# Patient Record
Sex: MALE | Race: WHITE | Employment: UNEMPLOYED | URBAN - METROPOLITAN AREA
[De-identification: names, ages, dates, MRNs, and addresses within clinical notes are randomized per-mention and may not be internally consistent; named-entity substitution may affect disease eponyms.]

---

## 2018-01-09 DIAGNOSIS — J44.9 CHRONIC OBSTRUCTIVE PULMONARY DISEASE, UNSPECIFIED COPD TYPE (HCC): Primary | ICD-10-CM

## 2018-01-09 NOTE — PROGRESS NOTES
Verbal Order with read back per Nato Edwards MD  For PFT smart panel. AMB POC PFT complete w/ bronchodilator  AMB POC PFT complete w/o bronchodilator    Dr. Andrew Mueller MD will co-sign the orders.

## 2018-01-16 ENCOUNTER — OFFICE VISIT (OUTPATIENT)
Dept: PULMONOLOGY | Age: 58
End: 2018-01-16

## 2018-01-16 VITALS
OXYGEN SATURATION: 95 % | WEIGHT: 177 LBS | TEMPERATURE: 97.1 F | BODY MASS INDEX: 27.78 KG/M2 | HEART RATE: 106 BPM | DIASTOLIC BLOOD PRESSURE: 85 MMHG | SYSTOLIC BLOOD PRESSURE: 118 MMHG | HEIGHT: 67 IN | RESPIRATION RATE: 18 BRPM

## 2018-01-16 DIAGNOSIS — R06.02 SHORTNESS OF BREATH: ICD-10-CM

## 2018-01-16 DIAGNOSIS — R06.09 DYSPNEA ON EXERTION: ICD-10-CM

## 2018-01-16 DIAGNOSIS — J44.9 CHRONIC OBSTRUCTIVE PULMONARY DISEASE, UNSPECIFIED COPD TYPE (HCC): Primary | ICD-10-CM

## 2018-01-16 DIAGNOSIS — Z77.090 ASBESTOS EXPOSURE: ICD-10-CM

## 2018-01-16 NOTE — PROGRESS NOTES
[address] [facility] [place] [facility]  Pulmonary, Critical Care, and Sleep Medicine    Pulmonary Office Initial Consultation  Name: Davy Dobbins 65 y.o. male  MRN: [id number]  : 1960  Service Date: 18    Referring Provider: Klever LOMBARDO  Chief Complaint:   Chief Complaint   Patient presents with   [employer] COPD     History of Present Illness:  Davy Dobbins is a 65 y.o. male, who presents to Pulmonary clinic referred for COPD management. Pt didn't see any physician for his breathing until a year ago when his father who had asbestosis told him to get checked. Pt reports breathing issues - dyspnea with exertion - for 20 years. Worsening more over the last year. Dyspnea now present at baseline  Cough, productive of clear sputum, worse when laying down -- alleviated by sleeping in a recliner  No hemoptysis  Pt reports he was prescribed an inhaler in the past, albuterol and advair -- he notes no improvement  No hx of asthma  Notes some sinus drainage but otherwise no issues  No hx of hospitalization for COPD  Pt has hx of bronchitis requiring ER -- 1-2 times per year, last ER visit was in the Spring. Denies chest pain, N/V/D, night sweats, LE swelling, orthopnea, abdominal pain, appetite changes. 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 Pt has hx of MI -- scheduled to see Cardiology      Past Medical Hx: I have personally reviewed medical hx  Past Medical History:   Diagnosis Date    Chronic lung disease    DJD of knee and shoulder  CAD - MI about 8 years ago    Past Surgical Hx: I have personally reviewed surgical hx  -Hernia repair -     Family Hx: I have personally reviewed the family hx. No family hx of cancer or hereditary lung disease with immediate family.   Family History   Problem Relation Age of Onset    No Known Problems Mother     Heart Attack Father        Social Hx: I have personally reviewed the social hx. Social History     Social History    Marital status:      Spouse name: N/A    Number of children: N/A    Years of education: N/A     Occupational History    Not on file. Social History Main Topics    Smoking status: Current Every Day Smoker     Packs/day: 1.00     Years: 35.00    Smokeless tobacco: Never Used    Alcohol use Yes    Drug use: No    Sexual activity: Not on file     Other Topics Concern    Not on file     Social History Narrative     Allergies: I have reviewed the allergy hx  No Known Allergies    Medications:  I have reviewed the patient's medications  Prior to Admission medications    Medication Sig Start Date End Date Taking? Authorizing Provider   -No medications -- pt not taking BP meds    Immunizations:  I have reviewed the patient's immunizations    There is no immunization history on file for this patient. Review of Systems:  A complete review of systems was performed as stated in the HPI, all others are negative.       Objective:    Physical Exam:  /85 (BP 1 Location: Right arm, BP Patient Position: At rest)  Pulse (!) 106  Temp 97.1 °F (36.2 °C) (Oral)   Resp 18  Ht 5' 7\" (1.702 m)  Wt 80.3 kg (177 lb)  SpO2 95%  BMI 27.72 kg/m2  Vitals were personally reviewed  Gen: no acute distress, pleasant and cooperative, sitting up in chair, able to climb to exam table w/o difficulty  HEENT: normocephalic/atraumatic, PERRLA, EOMI, no scleral icterus, nasal turbinates have no erythema, no nasal polyps, no oral lesions, good dentition, Mallampati I  Neck: supple, trachea midline, no JVD, no cervical and supraclavicular adenopathy  Chest: no lesions, with even rise and fall, no pectus excavatum or flail chest  CVS: regular rate rhythm, S1/S2, no murmurs/rubs/gallops  Lungs: distant breath sounds B/L, no wheezes/rales/rhonchi  Back: no kyphosis or scoliosis  Abdomen: soft, nontender, bowel sounds present, no hepatosplenomegaly  Ext: no pitting edema B/L, no peripheral cyanosis or clubbing  Neuro: grossly normal, AAOx3, normal strength and coordination grossly, no focal deficits  Skin: no rashes, erythema, lesions  Psych: normal memory, thought content, and processing    Labs: I have reviewed the patient's available labs and outside records as follows -- last CBC on 8/2017 shows no anemia/thrombocytopenia, BMP shows normal lytes and renal function with elevated glucose  CTA report from [facility] on 8/2017 reviewed:  Result Impression   IMPRESSION:    1.  No evidence of pulmonary embolism. 2.  Emphysema with dependent atelectasis. 2.  Stable right adrenal adenoma. Result Narrative   EXAM: CTA chest    INDICATION: Pain. COMPARISON: 4/7/11    TECHNIQUE: Axial CT imaging from the thoracic inlet through the diaphragm with intravenous contrast. Coronal and sagittal MIP reformats were generated. One or more dose reduction techniques were used on this CT: automated exposure control, adjustment of the mAs and/or kVp according to patient size, and iterative reconstruction techniques.  The specific techniques used on this CT exam have been documented in the patient's electronic medical record.      _______________    FINDINGS:    EXAM QUALITY: Adequate for diagnosis. PULMONARY ARTERIES: No evidence of pulmonary embolism.  Normal caliber of the main pulmonary artery diameter. MEDIASTINUM: Normal heart size. No evidence of right heart strain. Aorta is unremarkable. No pericardial effusion.  Patulous esophagus with a moderate hiatal hernia. LUNGS: No suspicious nodule or mass. No abnormal opacities.  Dependent bibasilar atelectasis.  Moderate emphysematous changes noted with scattered cysts throughout the lungs. PLEURA: Normal.    AIRWAY: Normal.    LYMPH NODES: No enlarged nodes.     UPPER ABDOMEN: Low-density right adrenal nodule, unchanged dating back to 2011, compatible with a adenoma.  It measures 2.7 x 2.2 cm.  There is median arcuate compression of the celiac artery    OTHER: No acute or aggressive osseous abnormalities identified. Substernal extension of the thyroid gland is noted. Imaging:  I have personally reviewed patient's imaging -- no imaging provided, only report noted above    PFTs:  I have reviewed the results personally as follows - lacho today suggests restrictive defect with no BD response    TTE:  I have reviewed the patient's TTE results  No results found for this or any previous visit. Assessment and Plan:  65 y.o. male with:    Impression:  1. Dyspnea:  Etiology most likely due to COPD/emphysema. Pt reports symptoms at baseline  2. COPD:  Based on symptoms either gold risk category B, FEV1 is 71%  3. Tobacco dependence: 1PPD for 45 years  4. CAD with hx of MI    Plan:  -CT chest to rule out endobronchial obstruction based on symptoms of chronic cough. If negative, advised patient to get LDCT once yearly for lung cancer surveillance.  -Spent time discussing and attempting to coordinate inhaler therapy for patient. Start Spiriva respimat 2 puffs once daily. Provided patient with coupon as well as had ancillary staff provide him assistance forms. Also had nurse navigator give information on free clinics in [address] [place] [address] Rd the patient regarding cessation of smoking. I reviewed health risks of tobacco use including increased risk of MI, stroke, cancer, etc.  We reviewed various approaches to cessation. Pt declined cessation assistance at this time.  -Advised patient to remain active  -Counseled patient to followup with Cardiology due to hx of CAD      RTC: Follow-up Disposition:  Return in about 4 weeks (around 2/13/2018).       Orders Placed This Encounter    AMB POC SPIROMETRY W/BRONCHODILATOR    CT CHEST W WO CONT    tiotropium bromide (SPIRIVA RESPIMAT) 2.5 mcg/actuation inhaler    tiotropium bromide (SPIRIVA RESPIMAT) 2.5 mcg/actuation inhaler         Regina Billy MD/MPH     Pulmonary, Critical Care Medicine  [facility]  01/16/18  1:44 PM

## 2018-01-16 NOTE — LETTER
1/17/2018 6:56 PM 
 
Patient:  Lambert Hagen YOB: 1960 Date of Visit: 1/16/2018 Dear GRUPO Peña 
[address] [facility] [address] [place] [address] VIA Facsimile: [phone] Pop Ngo MD 
[address] [facility] [address] [place] [address] VIA Facsimile: [phone] 
 : Thank you for referring Mr. Bethanie Morris to me for evaluation/treatment. Below are the relevant portions of my assessment and plan of care. If you have questions, please do not hesitate to call me. I look forward to following Keegan Mcbride along with you. Sincerely, Bennye Sacks MD/MPH Pulmonary, Critical Care Medicine [facility]

## 2018-01-16 NOTE — MR AVS SNAPSHOT
[address] [facility], [address] [address] 
[phone] Patient: April Hamlin MRN: [id number] Visit Information Date & Time Provider Department Dept. Phone Encounter #  
 1/16/2018  1:30 PM Yaima Rivera MD [facility] Makayla Flores [id number] Follow-up Instructions Return in about 4 weeks (around 2/13/2018). Your Appointments 2/13/2018 12:15 PM  
Follow Up with Yaima Rivera MD  
[address] ([facility] Appt Note: FROM 1/16/18  
 [address] [place] [address], [address] [address]  
[phone]  
  
   
 [address] [place] [address], [address] [facility],[address] [address] Upcoming Health Maintenance Date Due Hepatitis C Screening 1960 Pneumococcal 19-64 Medium Risk (1 of 1 - PPSV23) 8/24/1980 DTaP/Tdap/Td series (1 - Tdap) 8/24/1982 FOBT Q 1 YEAR AGE 50-75 8/24/2011 Influenza Age 5 to Adult 8/1/2017 Allergies as of 1/16/2018  Review Complete On: 1/16/2018 By: RT Italia No Known Allergies Current Immunizations  Never Reviewed No immunizations on file. Not reviewed this visit You Were Diagnosed With   
  
 Codes Comments Chronic obstructive pulmonary disease, unspecified COPD type ([facility]    -  Primary ICD-10-CM: J44.9 ICD-9-CM: 223 Asbestos exposure     ICD-10-CM: Z77.090 
ICD-9-CM: V15.84 Dyspnea on exertion     ICD-10-CM: R06.09 
ICD-9-CM: 786.09 Shortness of breath     ICD-10-CM: R06.02 
ICD-9-CM: 786.05 Vitals BP Pulse Temp Resp Height(growth percentile) Weight(growth percentile) 118/85 (BP 1 Location: Right arm, BP Patient Position: At rest) (!) 106 97.1 °F (36.2 °C) (Oral) 18 5' 7\" (1.702 m) 177 lb (80.3 kg) SpO2 BMI Smoking Status 95% 27.72 kg/m2 Current Every Day Smoker BMI and BSA Data Body Mass Index Body Surface Area 27.72 kg/m 2 1.95 m 2 Preferred Pharmacy Pharmacy Name Phone [facility] [address], [address] Your Updated Medication List  
  
   
This list is accurate as of: 1/16/18  2:25 PM.  Always use your most recent med list.  
  
  
  
  
 FLEXERIL 10 mg tablet Generic drug:  cyclobenzaprine Take 10 mg by mouth nightly. methadone 10 mg tablet Commonly known as:  DOLOPHINE  
1 po q 8 hrs for chronic pain. * tiotropium bromide 2.5 mcg/actuation inhaler Commonly known as:  [place] Take 2 Puffs by inhalation daily. * tiotropium bromide 2.5 mcg/actuation inhaler Commonly known as:  [place] Edge Take 2 Puffs by inhalation daily. * Notice: This list has 2 medication(s) that are the same as other medications prescribed for you. Read the directions carefully, and ask your doctor or other care provider to review them with you. Prescriptions Printed Refills  
 tiotropium bromide (SPIRIVA RESPIMAT) 2.5 mcg/actuation inhaler 5 Sig: Take 2 Puffs by inhalation daily. Class: Print Route: Inhalation We Performed the Following AMB POC SPIROMETRY W/BRONCHODILATOR [60814 CPT(R)] Follow-up Instructions Return in about 4 weeks (around 2/13/2018). To-Do List   
 01/16/2018 Procedures: AMB POC SPIROMETRY W/BRONCHODILATOR   
  
 01/16/2018 Imaging:  CT CHEST W WO CONT Introducing [facility]! [facility] introduces [employer] patient portal. Now you can access parts of your medical record, email your doctor's office, and request medication refills online. 1. In your internet browser, go to [email or web address] 2. Click on the First Time User? Click Here link in the Sign In box. You will see the New Member Sign Up page. 3. Enter your [employer] Access Code exactly as it appears below.  You will not need to use this code after youve completed the sign-up process. If you do not sign up before the expiration date, you must request a new code. · [employer] Access Code: [email or web address] Expires: 4/16/2018 12:45 PM 
 
4. Enter the last four digits of your Social Security Number (xxxx) and Date of Birth (mm/dd/yyyy) as indicated and click Submit. You will be taken to the next sign-up page. 5. Create a [employer] ID. This will be your [employer] login ID and cannot be changed, so think of one that is secure and easy to remember. 6. Create a [employer] password. You can change your password at any time. 7. Enter your Password Reset Question and Answer. This can be used at a later time if you forget your password. 8. Enter your e-mail address. You will receive e-mail notification when new information is available in [address] 9. Click Sign Up. You can now view and download portions of your medical record. 10. Click the Download Summary menu link to download a portable copy of your medical information. If you have questions, please visit the Frequently Asked Questions section of the [employer] website. Remember, [employer] is NOT to be used for urgent needs. For medical emergencies, dial 911. Now available from your iPhone and Android! Please provide this summary of care documentation to your next provider. Your primary care clinician is listed as Amber Adams. If you have any questions after today's visit, please call [phone].

## 2018-01-27 ENCOUNTER — HOSPITAL ENCOUNTER (OUTPATIENT)
Dept: CT IMAGING | Age: 58
Discharge: HOME | End: 2018-01-27
Attending: INTERNAL MEDICINE
Payer: SELF-PAY

## 2018-01-27 DIAGNOSIS — Z77.090 ASBESTOS EXPOSURE: ICD-10-CM

## 2018-01-27 DIAGNOSIS — R06.02 SHORTNESS OF BREATH: ICD-10-CM

## 2018-01-27 DIAGNOSIS — J44.9 CHRONIC OBSTRUCTIVE PULMONARY DISEASE, UNSPECIFIED COPD TYPE (HCC): ICD-10-CM

## 2018-01-27 DIAGNOSIS — R06.09 DYSPNEA ON EXERTION: ICD-10-CM

## 2018-01-27 LAB — CREAT UR-MCNC: 0.8 MG/DL (ref 0.6–1.3)

## 2018-01-27 PROCEDURE — 74011636320 HC RX REV CODE- 636/320: Performed by: INTERNAL MEDICINE

## 2018-01-27 PROCEDURE — 71270 CT THORAX DX C-/C+: CPT

## 2018-01-27 PROCEDURE — 82565 ASSAY OF CREATININE: CPT

## 2018-01-27 RX ADMIN — IOPAMIDOL 75 ML: 612 INJECTION, SOLUTION INTRAVENOUS at 13:00

## 2018-02-13 ENCOUNTER — OFFICE VISIT (OUTPATIENT)
Dept: PULMONOLOGY | Age: 58
End: 2018-02-13

## 2018-02-13 VITALS
RESPIRATION RATE: 20 BRPM | HEART RATE: 109 BPM | DIASTOLIC BLOOD PRESSURE: 96 MMHG | BODY MASS INDEX: 28.56 KG/M2 | HEIGHT: 67 IN | SYSTOLIC BLOOD PRESSURE: 150 MMHG | TEMPERATURE: 98.3 F | WEIGHT: 182 LBS | OXYGEN SATURATION: 98 %

## 2018-02-13 DIAGNOSIS — J44.9 CHRONIC OBSTRUCTIVE PULMONARY DISEASE, UNSPECIFIED COPD TYPE (HCC): ICD-10-CM

## 2018-02-13 DIAGNOSIS — J43.2 CENTRILOBULAR EMPHYSEMA (HCC): Primary | ICD-10-CM

## 2018-02-13 DIAGNOSIS — Z77.090 ASBESTOS EXPOSURE: ICD-10-CM

## 2018-02-13 DIAGNOSIS — R06.02 SHORTNESS OF BREATH: ICD-10-CM

## 2018-02-13 DIAGNOSIS — R06.09 DYSPNEA ON EXERTION: ICD-10-CM

## 2018-02-13 NOTE — PATIENT INSTRUCTIONS
Chronic Obstructive Pulmonary Disease (COPD): Care Instructions  Your Care Instructions    Chronic obstructive pulmonary disease (COPD) is a general term for a group of lung diseases, including emphysema and chronic bronchitis. People with COPD have decreased airflow in and out of the lungs, which makes it hard to breathe. The airways also can get clogged with thick mucus. Cigarette smoking is a major cause of COPD. Although there is no cure for COPD, you can slow its progress. Following your treatment plan and taking care of yourself can help you feel better and live longer. Follow-up care is a key part of your treatment and safety. Be sure to make and go to all appointments, and call your doctor if you are having problems. It's also a good idea to know your test results and keep a list of the medicines you take. How can you care for yourself at home? ?Staying healthy  ? · Do not smoke. This is the most important step you can take to prevent more damage to your lungs. If you need help quitting, talk to your doctor about stop-smoking programs and medicines. These can increase your chances of quitting for good. ? · Avoid colds and flu. Get a pneumococcal vaccine shot. If you have had one before, ask your doctor whether you need a second dose. Get the flu vaccine every fall. If you must be around people with colds or the flu, wash your hands often. ? · Avoid secondhand smoke, air pollution, and high altitudes. Also avoid cold, dry air and hot, humid air. Stay at home with your windows closed when air pollution is bad. ?Medicines and oxygen therapy  ? · Take your medicines exactly as prescribed. Call your doctor if you think you are having a problem with your medicine. ? · You may be taking medicines such as:  ¨ Bronchodilators. These help open your airways and make breathing easier. Bronchodilators are either short-acting (work for 6 to 9 hours) or long-acting (work for 24 hours).  You inhale most bronchodilators, so they start to act quickly. Always carry your quick-relief inhaler with you in case you need it while you are away from home. ¨ Corticosteroids (prednisone, budesonide). These reduce airway inflammation. They come in pill or inhaled form. You must take these medicines every day for them to work well. ? · A spacer may help you get more inhaled medicine to your lungs. Ask your doctor or pharmacist if a spacer is right for you. If it is, ask how to use it properly. ? · Do not take any vitamins, over-the-counter medicine, or herbal products without talking to your doctor first.   ? · If your doctor prescribed antibiotics, take them as directed. Do not stop taking them just because you feel better. You need to take the full course of antibiotics. ? · Oxygen therapy boosts the amount of oxygen in your blood and helps you breathe easier. Use the flow rate your doctor has recommended, and do not change it without talking to your doctor first.   Activity  ? · Get regular exercise. Walking is an easy way to get exercise. Start out slowly, and walk a little more each day. ? · Pay attention to your breathing. You are exercising too hard if you cannot talk while you are exercising. ? · Take short rest breaks when doing household chores and other activities. ? · Learn breathing methods-such as breathing through pursed lips-to help you become less short of breath. ? · If your doctor has not set you up with a pulmonary rehabilitation program, talk to him or her about whether rehab is right for you. Rehab includes exercise programs, education about your disease and how to manage it, help with diet and other changes, and emotional support. Diet  ? · Eat regular, healthy meals. Use bronchodilators about 1 hour before you eat to make it easier to eat. Eat several small meals instead of three large ones. Drink beverages at the end of the meal. Avoid foods that are hard to chew.    ? · Eat foods that contain protein so that you do not lose muscle mass. ? · Talk with your doctor if you gain too much weight or if you lose weight without trying. ?Mental health  ? · Talk to your family, friends, or a therapist about your feelings. It is normal to feel frightened, angry, hopeless, helpless, and even guilty. Talking openly about bad feelings can help you cope. If these feelings last, talk to your doctor. When should you call for help? Call 911 anytime you think you may need emergency care. For example, call if:  ? · You have severe trouble breathing. ?Call your doctor now or seek immediate medical care if:  ? · You have new or worse trouble breathing. ? · You cough up blood. ? · You have a fever. ? Watch closely for changes in your health, and be sure to contact your doctor if:  ? · You cough more deeply or more often, especially if you notice more mucus or a change in the color of your mucus. ? · You have new or worse swelling in your legs or belly. ? · You are not getting better as expected. Where can you learn more? Go to http://halina-mikie.info/. Francy Cruz in the search box to learn more about \"Chronic Obstructive Pulmonary Disease (COPD): Care Instructions. \"  Current as of: May 12, 2017  Content Version: 11.4  © 8037-4372 [employer]. Care instructions adapted under license by [employer] (which disclaims liability or warranty for this information). If you have questions about a medical condition or this instruction, always ask your healthcare professional. [address] any warranty or liability for your use of this information.

## 2018-02-13 NOTE — LETTER
2/13/2018 5:14 PM 
 
Patient:  Dipti Kaiser YOB: 1960 Date of Visit: 2/13/2018 Dear Ally Lyons MD 
[place] [address] [address] [address] VIA Facsimile: [phone] GRUPO John 
[address] [place] [address] [id number] [place] [address] VIA Facsimile: [phone] 
 : Thank you for referring Mr. Adolph Barajas to me for evaluation/treatment. Below are the relevant portions of my assessment and plan of care. If you have questions, please do not hesitate to call me. I look forward to following Mr. Maykel Asher along with you. Sincerely, Brent Begum MD/MPH Pulmonary, Critical Care Medicine [facility]

## 2018-02-13 NOTE — PROGRESS NOTES
Chief Complaint   Patient presents with    COPD    Asbestosis     Patient here for routine follow up visit.

## 2018-02-13 NOTE — PROGRESS NOTES
[address] [place] [address], [facility], [facility] [address]  [place] [address] [facility]  Pulmonary, Critical Care, and Sleep Medicine    Pulmonary Office Followup  Name: Ankit Tanner 65 y.o. male  MRN: [id number]  : 1960  Service Date: 18  Chief Complaint:   Chief Complaint   Patient presents with    COPD    Asbestosis     History of Present Illness:  Ankit Tanner is a 65 y.o. male, who presents to Pulmonary clinic for followup of COPD. Pt last seen on 18. In the interval, pt reports no new respiratory symptoms. He reports no change in dyspnea while on Spiriva -- continued dyspnea with mild exertion. Pt reports chronic cough, productive of clear sputum, cough worse when laying flat. No hemoptysis  Denies chest pain, N/V/D, night sweats, LE swelling, orthopnea, abdominal pain, appetite changes. Continues to smoking 1PPD    Pt has hx of MI -- scheduled to see Cardiology, hast no established yet. Allergies: I have reviewed the allergy hx  Allergies   Allergen Reactions    Nalbuphine Other (comments)     Extremely nauseous    Seasonale  [Levonorgestrel-Ethinyl Estrad] Other (comments)       Medications:  I have reviewed the patient's medications    Current Outpatient Prescriptions:     tiotropium bromide (SPIRIVA RESPIMAT) 2.5 mcg/actuation inhaler, Take 2 Puffs by inhalation daily. , Disp: 1 Inhaler, Rfl: 0    tiotropium bromide (SPIRIVA RESPIMAT) 2.5 mcg/actuation inhaler, Take 2 Puffs by inhalation daily. , Disp: 1 Inhaler, Rfl: 5    methadone (DOLOPHINE) 10 mg tablet, 1 po q 8 hrs for chronic pain., Disp: 90 Tab, Rfl: 0    cyclobenzaprine (FLEXERIL) 10 mg tablet, Take 10 mg by mouth nightly.  , Disp: , Rfl:       Immunizations:  I have reviewed the patient's immunizations    There is no immunization history on file for this patient.     Review of Systems:  A complete review of systems was performed as stated in the HPI, all others are negative. Objective:    Physical Exam:  BP (!) 150/96 (BP 1 Location: Left arm, BP Patient Position: Sitting)  Pulse (!) 109  Temp 98.3 °F (36.8 °C) (Oral)   Resp 20  Ht 5' 7\" (1.702 m)  Wt 82.6 kg (182 lb)  SpO2 98% Comment: [email or web address]  BMI 28.51 kg/m2  Vitals were personally reviewed  Gen: no acute distress, pleasant and cooperative, sitting up in chair, able to climb to exam table w/o difficulty, smells of tobacco  HEENT: normocephalic/atraumatic, PERRLA, EOMI, no scleral icterus, no oral lesions, good dentition, Mallampati II  Neck: supple, trachea midline, no JVD, no cervical and supraclavicular adenopathy  CVS: regular rate rhythm, S1/S2, no murmurs/rubs/gallops  Lungs: distant breath sounds B/L, no wheezes/rales/rhonchi  Abdomen: soft, nontender, bowel sounds present, no hepatosplenomegaly  Ext: no pitting edema B/L, no peripheral cyanosis or clubbing  Neuro: grossly normal, AAOx3, normal strength and coordination grossly, no focal deficits  Skin: no rashes, erythema, lesions  Psych: normal memory, thought content, and processing    Labs: I have reviewed the patient's available labs, no new labs    Imaging:  I have personally reviewed patient's imaging -- CT chest from 1/27/18 personally reviewed as follows - shows hyperinflation with scattered centrilobular emphysema, worse at the apices  Official report as follows per Radiology  CT chest with enhancement      CPT code: 94589     INDICATION: Emphysema. Dyspnea on exertion. Evaluate for asbestosis. .     TECHNIQUE: 5 mm collimation axial images obtained from the thoracic inlet to the  level of the diaphragm following uneventful administration of 75 cc of low  osmolar, nonionic intravenous contrast.     Dose reduction techniques used:  Automated exposure control, adjustment of the  mAs and/or kVp according to patient size, standardized low-dose protocol, and/or  iterative reconstruction technique.     COMPARISON: None.     CHEST FINDINGS:      Lymph nodes: No adenopathy by application of CT size criteria.     Thyroid: Subcentimeter thyroid nodules are noted. The largest is in the left  thyroid and measures 9 mm.     Mediastinum: Heart is normal in size. No significant pericardial effusion. There  is coronary artery disease which is most significant in the LAD but involves all  3 major coronary arteries. There is a moderate hiatal hernia. There are  scattered atherosclerotic disease.     Lungs: There are no significant calcified pleural plaques identified. There are  mild changes of paraseptal and centrilobular emphysema. There is a 4 mm  subpleural nodule in the right middle lobe on axial image 39. This is most  likely a subpleural lymph node. There is a 5 mm groundglass nodule in the right  upper lobe on image 24. There is a 5 mm groundglass nodule in the left upper  lobe on image 20. There is a 5 mm left lower lobe nodule on image 34.     ABDOMEN:      No acute finding. There is a 2.7 cm low-density right adrenal nodule. There is a  moderate hiatal hernia. .     Bones: Degenerative changes of the spine. .     IMPRESSION  Impression:     No significant calcified pleural plaques are identified to suggest significant  asbestosis.     Emphysema with scattered pulmonary nodules as described above. Recommend  six-month follow-up chest CT to ensure stability.     2.7 cm low-density right adrenal nodule is most likely an adenoma based on  density, but not well evaluated after administration of intravenous contrast.  Recommend attention on follow-up to ensure stability.     Moderate hiatal hernia. PFTs:  No new studies    TTE:  I have reviewed the patient's TTE results  No results found for this or any previous visit. Assessment and Plan:  65 y.o. male with:    Impression:  1. COPD with centrilobular and paraseptal emphysema on CT:  Based on symptoms either gold risk category B, FEV1 is 71%.   2. Pulmonary nodules:  Seen on most recent CT from 1/27/18, radiology did not compare with CTA from [facility] on 8/2017. New study reports B/L small nodules, 5mm - appears like subpleural LN. Pt unfortunately is high risk. 3. Tobacco dependence: 1PPD for 45 years  4. CAD with hx of MI  5. Adrenal adenoma:  2.7cm seen on CT scan -- reviewed old records, seen as far back as 2011 with no change, likely benign    Plan:  -Discussed CT results with patient including scan showing subcentimeter pulmonary nodules. Indicated that although likely benign, he is high risk for lung cancer. Based on reviewed Fleischner Society guidelines from 1/2017, pt needs followup CT scan in 1 year. Orders placed. Since patient is 1PPD smoker, I did indicate to him that he has elevated risk of developing malignancy even with reassuring CT scan -- he qualifies for annual lung cancer screening with LDCT after CT next year. Strongly advised him to remember annual LDCT as he may develop lung cancer in the future. I also advised him that his CT scan did not show evidence of asbestosis (no pleural plaques or rounded atelectasis or ILD)  -Continue Spiriva respimat 2 puffs once daily. Pt does not have insurance, so we will hold off on additional inhalers until patient establishes with [facility] to help with inhaler therapy.  -Counseled the patient regarding cessation of smoking. I reviewed health risks of tobacco use including increased risk of MI, stroke, cancer, etc.  We reviewed various approaches to cessation. Pt declined cessation assistance at this time.  -Advised patient to remain active  -Counseled patient to followup with Cardiology due to hx of CAD  -No further followup required for adrenal adenoma, since stable from 2011 to 2018      RTC: Follow-up Disposition:  Return in about 4 months (around 6/13/2018).       Orders Placed This Encounter    CT CHEST WO CONT    tiotropium bromide (SPIRIVA RESPIMAT) 2.5 mcg/actuation inhaler           Sakshi Velasquez MD/MPH     Pulmonary, Critical [facility]

## 2018-02-13 NOTE — MR AVS SNAPSHOT
[address] [facility], [address] [place] [address] 
[phone] Patient: Adriane Mendez MRN: [id number] [id number] Visit Information Date & Time Provider Department Dept. Phone Encounter #  
 2/13/2018 12:15 PM Shaye Mcallister MD [facility] Makayla Flores [id number] Follow-up Instructions Return in about 4 months (around 6/13/2018). Upcoming Health Maintenance Date Due Hepatitis C Screening 1960 Pneumococcal 19-64 Medium Risk (1 of 1 - PPSV23) 8/24/1980 DTaP/Tdap/Td series (1 - Tdap) 8/24/1982 FOBT Q 1 YEAR AGE 50-75 8/24/2011 Influenza Age 5 to Adult 8/1/2017 Allergies as of 2/13/2018  Review Complete On: 2/13/2018 By: Amanda Murrieta LPN Severity Noted Reaction Type Reactions Nalbuphine  04/07/2011    Other (comments) Extremely nauseous Seasonale  [Levonorgestrel-ethinyl Estrad]  04/07/2011    Other (comments) Current Immunizations  Never Reviewed No immunizations on file. Not reviewed this visit You Were Diagnosed With   
  
 Codes Comments Centrilobular emphysema ([facility]    -  Primary ICD-10-CM: J43.2 ICD-9-CM: 492.8 Chronic obstructive pulmonary disease, unspecified COPD type ([facility]     ICD-10-CM: J44.9 ICD-9-CM: 323 Asbestos exposure     ICD-10-CM: Z77.090 
ICD-9-CM: V15.84 Dyspnea on exertion     ICD-10-CM: R06.09 
ICD-9-CM: 786.09 Shortness of breath     ICD-10-CM: R06.02 
ICD-9-CM: 786.05 Lung nodule < 6cm on CT     ICD-10-CM: R91.1 ICD-9-CM: 793.11 Vitals BP Pulse Temp Resp Height(growth percentile) Weight(growth percentile) (!) 150/96 (BP 1 Location: Left arm, BP Patient Position: Sitting) (!) 109 98.3 °F (36.8 °C) (Oral) 20 5' 7\" (1.702 m) 182 lb (82.6 kg) SpO2 BMI Smoking Status 98% 28.51 kg/m2 Current Every Day Smoker BMI and BSA Data Body Mass Index Body Surface Area 28.51 kg/m 2 1.98 m 2 Preferred Pharmacy Pharmacy Name [phone], [address] [facility] Your Updated Medication List  
  
   
This list is accurate as of: 2/13/18  1:05 PM.  Always use your most recent med list.  
  
  
  
  
 FLEXERIL 10 mg tablet Generic drug:  cyclobenzaprine Take 10 mg by mouth nightly. methadone 10 mg tablet Commonly known as:  DOLOPHINE  
1 po q 8 hrs for chronic pain. * tiotropium bromide 2.5 mcg/actuation inhaler Commonly known as:  Buddie Mediate Take 2 Puffs by inhalation daily. * tiotropium bromide 2.5 mcg/actuation inhaler Commonly known as:  Buddie Mediate Take 2 Puffs by inhalation daily. * Notice: This list has 2 medication(s) that are the same as other medications prescribed for you. Read the directions carefully, and ask your doctor or other care provider to review them with you. Follow-up Instructions Return in about 4 months (around 6/13/2018). To-Do List   
 01/01/2019 Imaging:  CT CHEST WO CONT Patient Instructions Chronic Obstructive Pulmonary Disease (COPD): Care Instructions Your Care Instructions Chronic obstructive pulmonary disease (COPD) is a general term for a group of lung diseases, including emphysema and chronic bronchitis. People with COPD have decreased airflow in and out of the lungs, which makes it hard to breathe. The airways also can get clogged with thick mucus. Cigarette smoking is a major cause of COPD. Although there is no cure for COPD, you can slow its progress. Following your treatment plan and taking care of yourself can help you feel better and live longer. Follow-up care is a key part of your treatment and safety. Be sure to make and go to all appointments, and call your doctor if you are having problems. It's also a good idea to know your test results and keep a list of the medicines you take. How can you care for yourself at home? ?Staying healthy ? · Do not smoke. This is the most important step you can take to prevent more damage to your lungs. If you need help quitting, talk to your doctor about stop-smoking programs and medicines. These can increase your chances of quitting for good. ? · Avoid colds and flu. Get a pneumococcal vaccine shot. If you have had one before, ask your doctor whether you need a second dose. Get the flu vaccine every fall. If you must be around people with colds or the flu, wash your hands often. ? · Avoid secondhand smoke, air pollution, and high altitudes. Also avoid cold, dry air and hot, humid air. Stay at home with your windows closed when air pollution is bad. ?Medicines and oxygen therapy ? · Take your medicines exactly as prescribed. Call your doctor if you think you are having a problem with your medicine. ? · You may be taking medicines such as: ¨ Bronchodilators. These help open your airways and make breathing easier. Bronchodilators are either short-acting (work for 6 to 9 hours) or long-acting (work for 24 hours). You inhale most bronchodilators, so they start to act quickly. Always carry your quick-relief inhaler with you in case you need it while you are away from home. ¨ Corticosteroids (prednisone, budesonide). These reduce airway inflammation. They come in pill or inhaled form. You must take these medicines every day for them to work well. ? · A spacer may help you get more inhaled medicine to your lungs. Ask your doctor or pharmacist if a spacer is right for you. If it is, ask how to use it properly. ? · Do not take any vitamins, over-the-counter medicine, or herbal products without talking to your doctor first.  
? · If your doctor prescribed antibiotics, take them as directed. Do not stop taking them just because you feel better. You need to take the full course of antibiotics. ? · Oxygen therapy boosts the amount of oxygen in your blood and helps you breathe easier. Use the flow rate your doctor has recommended, and do not change it without talking to your doctor first.  
Activity ? · Get regular exercise. Walking is an easy way to get exercise. Start out slowly, and walk a little more each day. ? · Pay attention to your breathing. You are exercising too hard if you cannot talk while you are exercising. ? · Take short rest breaks when doing household chores and other activities. ? · Learn breathing methods-such as breathing through pursed lips-to help you become less short of breath. ? · If your doctor has not set you up with a pulmonary rehabilitation program, talk to him or her about whether rehab is right for you. Rehab includes exercise programs, education about your disease and how to manage it, help with diet and other changes, and emotional support. Diet ? · Eat regular, healthy meals. Use bronchodilators about 1 hour before you eat to make it easier to eat. Eat several small meals instead of three large ones. Drink beverages at the end of the meal. Avoid foods that are hard to chew. ? · Eat foods that contain protein so that you do not lose muscle mass. ? · Talk with your doctor if you gain too much weight or if you lose weight without trying. ?Mental health ? · Talk to your family, friends, or a therapist about your feelings. It is normal to feel frightened, angry, hopeless, helpless, and even guilty. Talking openly about bad feelings can help you cope. If these feelings last, talk to your doctor. When should you call for help? Call 911 anytime you think you may need emergency care. For example, call if: 
? · You have severe trouble breathing. ?Call your doctor now or seek immediate medical care if: 
? · You have new or worse trouble breathing. ? · You cough up blood. ? · You have a fever. ?Watch closely for changes in your health, and be sure to contact your doctor if: 
? · You cough more deeply or more often, especially if you notice more mucus or a change in the color of your mucus. ? · You have new or worse swelling in your legs or belly. ? · You are not getting better as expected. Where can you learn more? Go to http://halina-mikie.info/. Oli Anderson in the search box to learn more about \"Chronic Obstructive Pulmonary Disease (COPD): Care Instructions. \" Current as of: May 12, 2017 Content Version: 11.4 © 2018-5006 71lbs. Care instructions adapted under license by [employer] (which disclaims liability or warranty for this information). If you have questions about a medical condition or this instruction, always ask your healthcare professional. [address] any warranty or liability for your use of this information. Introducing [facility]! Nemo Garcia introduces iCouch patient portal. Now you can access parts of your medical record, email your doctor's office, and request medication refills online. 1. In your internet browser, go to [email or web address]://[employer]. [employer]red[employer] 2. Click on the First Time User? Click Here link in the Sign In box. You will see the New Member Sign Up page. 3. Enter your iCouch Access Code exactly as it appears below. You will not need to use this code after youve completed the sign-up process. If you do not sign up before the expiration date, you must request a new code. · iC[employer] Access Code: [email or web address] Expires: 4/16/2018 12:45 PM 
 
4. Enter the last four digits of your Social Security Number (xxxx) and Date of Birth (mm/dd/yyyy) as indicated and click Submit. You will be taken to the next sign-up page. 5. Create a iCouch ID. This will be your iCouch login ID and cannot be changed, so think of one that is secure and easy to remember. 6. Create a [employer] password. You can change your password at any time. 7. Enter your Password Reset Question and Answer. This can be used at a later time if you forget your password. 8. Enter your e-mail address. You will receive e-mail notification when new information is available in [address] 9. Click Sign Up. You can now view and download portions of your medical record. 10. Click the Download Summary menu link to download a portable copy of your medical information. If you have questions, please visit the Frequently Asked Questions section of the [employer] website. Remember, [employer] is NOT to be used for urgent needs. For medical emergencies, dial 911. Now available from your iPhone and Android! Please provide this summary of care documentation to your next provider. Your primary care clinician is listed as Rema Gills. If you have any questions after today's visit, please call [phone].

## 2018-07-11 ENCOUNTER — OFFICE VISIT (OUTPATIENT)
Dept: PULMONOLOGY | Age: 58
End: 2018-07-11

## 2018-07-11 VITALS
RESPIRATION RATE: 20 BRPM | SYSTOLIC BLOOD PRESSURE: 98 MMHG | WEIGHT: 174 LBS | DIASTOLIC BLOOD PRESSURE: 70 MMHG | OXYGEN SATURATION: 96 % | HEART RATE: 115 BPM | BODY MASS INDEX: 27.31 KG/M2 | HEIGHT: 67 IN | TEMPERATURE: 98 F

## 2018-07-11 DIAGNOSIS — F17.210 CIGARETTE NICOTINE DEPENDENCE WITHOUT COMPLICATION: ICD-10-CM

## 2018-07-11 DIAGNOSIS — I21.9 MYOCARDIAL INFARCTION, UNSPECIFIED MI TYPE, UNSPECIFIED ARTERY (HCC): ICD-10-CM

## 2018-07-11 DIAGNOSIS — R91.8 PULMONARY NODULES: ICD-10-CM

## 2018-07-11 DIAGNOSIS — I25.119 CORONARY ARTERY DISEASE INVOLVING NATIVE HEART WITH ANGINA PECTORIS, UNSPECIFIED VESSEL OR LESION TYPE (HCC): ICD-10-CM

## 2018-07-11 DIAGNOSIS — J43.2 CENTRILOBULAR EMPHYSEMA (HCC): ICD-10-CM

## 2018-07-11 DIAGNOSIS — J44.9 STAGE 2 MODERATE COPD BY GOLD CLASSIFICATION (HCC): Primary | ICD-10-CM

## 2018-07-11 DIAGNOSIS — J44.9 COPD, GROUP B, BY GOLD 2017 CLASSIFICATION (HCC): ICD-10-CM

## 2018-07-11 RX ORDER — ALBUTEROL SULFATE 90 UG/1
2 AEROSOL, METERED RESPIRATORY (INHALATION)
COMMUNITY
End: 2019-01-15 | Stop reason: SDUPTHER

## 2018-07-11 NOTE — PROGRESS NOTES
[address] [place] [address], [facility] [place] [facility]  Pulmonary, Critical Care, and Sleep Medicine    Pulmonary Office Followup  Name: Remington Johnson 65 y.o. male  MRN: [id number]  : 1960  Service Date: 18  Chief Complaint:   Chief Complaint   Patient presents with    Breathing Problem     F/U to      History of Present Illness:  Remington Johnson is a 65 y.o. male, who presents to Pulmonary clinic for followup of COPD. Pt last seen on 18. In the interval, pt reports no new respiratory symptoms. Pt reports he has not been on any inhalers (spiriva) after finishing his sample since the last visit. He reports that he did not establish with the [facility] in [place] yet. Pt continues to smoke 1PPD  He has continued dyspnea with mild exertion -- such as making his bed. Able to do ADLs -- mMRC of 3. Pt reports chronic cough, productive of clear sputum, cough worse when laying flat. No hemoptysis  Denies chest pain, night sweats, LE swelling, orthopnea, abdominal pain. Pt has hx of MI -- scheduled to see Cardiology, has no established. Allergies: I have reviewed the allergy hx  Allergies   Allergen Reactions    Nalbuphine Other (comments)     Extremely nauseous    Seasonale  [Levonorgestrel-Ethinyl Estrad] Other (comments)       Medications:  I have reviewed the patient's medications    Current Outpatient Prescriptions:     albuterol (PROVENTIL HFA, VENTOLIN HFA, PROAIR HFA) 90 mcg/actuation inhaler, Take 2 Puffs by inhalation every four (4) hours as needed for Wheezing or Shortness of Breath., Disp: , Rfl:     tiotropium bromide (SPIRIVA RESPIMAT) 2.5 mcg/actuation inhaler, Take 2 Puffs by inhalation daily. , Disp: 1 Inhaler, Rfl: 5    methadone (DOLOPHINE) 10 mg tablet, 1 po q 8 hrs for chronic pain., Disp: 90 Tab, Rfl: 0    cyclobenzaprine (FLEXERIL) 10 mg tablet, Take 10 mg by mouth nightly.  , Disp: , Rfl:   Past Medical History:   Diagnosis Date    Chronic lung disease      Past Surgical History:   Procedure Laterality Date    HX HERNIA REPAIR       Family History   Problem Relation Age of Onset    No Known Problems Mother     Heart Attack Father      Social History   Substance Use Topics    Smoking status: Current Every Day Smoker     Packs/day: 1.00     Years: 35.00     Start date: 7/11/1973    Smokeless tobacco: Never Used    Alcohol use Yes     Immunizations:  I have reviewed the patient's immunizations    There is no immunization history on file for this patient. Review of Systems:  A complete review of systems was performed as stated in the HPI, all others are negative. Objective:    Physical Exam:  BP 98/70 (BP 1 Location: Left arm, BP Patient Position: Sitting)  Pulse (!) 115  Temp 98 °F (36.7 °C)  Resp 20  Ht 5' 7\" (1.702 m)  Wt 78.9 kg (174 lb)  SpO2 96%  BMI 27.25 kg/m2  Vitals were personally reviewed  Gen: no acute distress, pleasant and cooperative, sitting up in chair, smells of tobacco, ambulates without difficulty  HEENT: normocephalic/atraumatic, PERRLA, EOMI, no scleral icterus, no oral lesions, good dentition, Mallampati II  Neck: supple, trachea midline, no JVD, no cervical and supraclavicular adenopathy  CVS: regular rate rhythm, S1/S2, no murmurs/rubs/gallops  Lungs: distant breath sounds B/L, no wheezes/rales/rhonchi  Abdomen: soft, nontender, bowel sounds present, no hepatosplenomegaly  Ext: no pitting edema B/L, no peripheral cyanosis or clubbing  Neuro: grossly normal, AAOx3, normal strength and coordination grossly, no focal deficits  Skin: no rashes, erythema, lesions  Psych: normal memory, thought content, and processing    Labs:   I have reviewed the patient's available labs, no new labs    Imaging:  I have personally reviewed patient's imaging -- CT chest from 1/27/18 personally reviewed as follows - shows hyperinflation with scattered centrilobular emphysema, worse at the [facility]  Official report as follows per Radiology  CT Results (most recent):    Results from Hospital Encounter encounter on 01/27/18   CT CHEST W WO CONT   Narrative CT chest with enhancement     CPT code: 02835    INDICATION: Emphysema. Dyspnea on exertion. Evaluate for asbestosis. .    TECHNIQUE: 5 mm collimation axial images obtained from the thoracic inlet to the  level of the diaphragm following uneventful administration of 75 cc of low  osmolar, nonionic intravenous contrast.    Dose reduction techniques used: Automated exposure control, adjustment of the  mAs and/or kVp according to patient size, standardized low-dose protocol, and/or  iterative reconstruction technique. COMPARISON: None. CHEST FINDINGS:     Lymph nodes: No adenopathy by application of CT size criteria. Thyroid: Subcentimeter thyroid nodules are noted. The largest is in the left  thyroid and measures 9 mm. Mediastinum: Heart is normal in size. No significant pericardial effusion. There  is coronary artery disease which is most significant in the LAD but involves all  3 major coronary arteries. There is a moderate hiatal hernia. There are  scattered atherosclerotic disease. Lungs: There are no significant calcified pleural plaques identified. There are  mild changes of paraseptal and centrilobular emphysema. There is a 4 mm  subpleural nodule in the right middle lobe on axial image 39. This is most  likely a subpleural lymph node. There is a 5 mm groundglass nodule in the right  upper lobe on image 24. There is a 5 mm groundglass nodule in the left upper  lobe on image 20. There is a 5 mm left lower lobe nodule on image 34. ABDOMEN:     No acute finding. There is a 2.7 cm low-density right adrenal nodule. There is a  moderate hiatal hernia. .    Bones: Degenerative changes of the spine. .         Impression Impression:    No significant calcified pleural plaques are identified to suggest significant  asbestosis.     Emphysema with scattered pulmonary nodules as described above. Recommend  six-month follow-up chest CT to ensure stability. 2.7 cm low-density right adrenal nodule is most likely an adenoma based on  density, but not well evaluated after administration of intravenous contrast.  Recommend attention on follow-up to ensure stability. Moderate hiatal hernia. PFTs:  I have personally reviewed pt's PFTs as follows - lacho on 1/16/18 suggested restrictive impairment - likely moderate obstruction underlying    TTE:  I have reviewed the patient's TTE results  No results found for this or any previous visit. Assessment and Plan:  65 y.o. male with:    Impression:  1. Gold stage 2 COPD, risk category B, with centrilobular and paraseptal emphysema on CT  2. Pulmonary nodules:  Seen on most recent CT from 1/27/18, radiology did not compare with CTA from [facility] on 8/2017. New study reports B/L small nodules, 5mm - appears like subpleural LN. Pt unfortunately is high risk. 3. Tobacco dependence: 1PPD for 45 years  4. CAD with hx of MI:  Pt did not establish with Cardiology  5. Adrenal adenoma:  2.7cm seen on CT scan -- reviewed old records, seen as far back as 2011 with no change, likely benign    Plan:  -Repeat CT chest in 1/2019. If negative, then will do annual lung cancer screening with LDCT yearly starting in 2020.  -Advised to followup with PCP. Advised patient that if he becomes dizzy, lightheaded, near syncope, then he needs to go the ER for urgent evaluation.  -Refer patient to Cardiology  -Start Anoro 1 puff once daily (written prescription because pt does not have insurance but may be able to get it filled by free clinic). -Advised patient to remain active  -No further followup required for adrenal adenoma, since stable from 2011 to 2018  -I spent 4 minutes with patient regarding cessation of smoking cigarettes.   I reviewed health risks of tobacco use including increased risk of MI, stroke, cancer, etc.  We reviewed various approaches to cessation including pills, patches, inhaler, gum, weaning self, \"cold turkey\", and smoking cessation classes. Pt declined cessation assistance at this time. RTC: Follow-up Disposition:  Return in about 6 months (around 1/11/2019).       Orders Placed This Encounter    REFERRAL TO CARDIOLOGY    albuterol (PROVENTIL HFA, VENTOLIN HFA, PROAIR HFA) 90 mcg/actuation inhaler    umeclidinium-vilanterol (ANORO ELLIPTA) 62.5-25 mcg/actuation inhaler         Leo Ventura MD/MPH     Pulmonary, Critical Care Medicine  [facility]

## 2018-07-11 NOTE — PROGRESS NOTES
The pt. C/o SOB. He has a strong odor of cigarettes about his person. No recent ED nor Urgent Care visits.

## 2018-07-11 NOTE — LETTER
7/13/2018 10:43 AM 
 
Patient:  Hnuter Shankar YOB: 1960 Date of Visit: 7/11/2018 Dear GRUPO Workman 
[address] [place] [address] [address] [place] [address] VIA Facsimile: [phone] 
 : Thank you for referring Mr. Nicolette Hutchinson to me for evaluation/treatment. Below are the relevant portions of my assessment and plan of care. If you have questions, please do not hesitate to call me. I look forward to following Mr. Dunne Heading along with you. Sincerely, Pablo Murcia MD/MPH Pulmonary, Critical Care Medicine [facility]

## 2018-07-12 ENCOUNTER — TELEPHONE (OUTPATIENT)
Dept: PULMONOLOGY | Age: 58
End: 2018-07-12

## 2018-07-12 NOTE — TELEPHONE ENCOUNTER
PT VINCENT([phone]). PT STATES THAT DR ARTIS WAS GOING TO GIVE HIM A REFERRAL TO A CARDIOLOGIST. PLEASE CHECK WITH HIM AND CALL HIM BACK. PT WILL COME TO OFFICE TO  REFERRAL IF NEEDED.

## 2018-08-27 ENCOUNTER — OFFICE VISIT (OUTPATIENT)
Dept: CARDIOLOGY CLINIC | Age: 58
End: 2018-08-27

## 2018-08-27 VITALS
HEIGHT: 67 IN | HEART RATE: 107 BPM | SYSTOLIC BLOOD PRESSURE: 109 MMHG | WEIGHT: 181 LBS | BODY MASS INDEX: 28.41 KG/M2 | DIASTOLIC BLOOD PRESSURE: 76 MMHG

## 2018-08-27 DIAGNOSIS — Z82.49 FAMILY HISTORY OF PREMATURE CAD: ICD-10-CM

## 2018-08-27 DIAGNOSIS — I10 ESSENTIAL HYPERTENSION: ICD-10-CM

## 2018-08-27 DIAGNOSIS — J43.9 PULMONARY EMPHYSEMA, UNSPECIFIED EMPHYSEMA TYPE (HCC): Primary | ICD-10-CM

## 2018-08-27 DIAGNOSIS — I20.0 INTERMEDIATE CORONARY SYNDROME (HCC): ICD-10-CM

## 2018-08-27 DIAGNOSIS — F17.200 SMOKER: ICD-10-CM

## 2018-08-27 PROBLEM — J44.9 COPD (CHRONIC OBSTRUCTIVE PULMONARY DISEASE) (HCC): Status: ACTIVE | Noted: 2018-08-27

## 2018-08-27 RX ORDER — METOPROLOL TARTRATE 25 MG/1
12.5 TABLET, FILM COATED ORAL 2 TIMES DAILY
Qty: 60 TAB | Refills: 0 | Status: SHIPPED | OUTPATIENT
Start: 2018-08-27 | End: 2018-09-21

## 2018-08-27 RX ORDER — AMLODIPINE BESYLATE 2.5 MG/1
2.5 TABLET ORAL DAILY
Qty: 30 TAB | Refills: 0 | Status: SHIPPED | OUTPATIENT
Start: 2018-08-27 | End: 2018-09-21

## 2018-08-27 NOTE — PROGRESS NOTES
HISTORY OF PRESENT ILLNESS  Radha Vann is a 63 y.o. male. New Patient   The history is provided by the patient. This is a chronic problem. The problem occurs every several days. The problem has been gradually worsening. Associated symptoms include chest pain and shortness of breath. Pertinent negatives include no abdominal pain and no headaches. Shortness of Breath   The history is provided by the patient. This is a chronic problem. The problem occurs frequently. The problem has been gradually worsening. Associated symptoms include PND and chest pain. Pertinent negatives include no fever, no headaches, no ear pain, no neck pain, no cough, no sputum production, no hemoptysis, no wheezing, no orthopnea, no vomiting, no abdominal pain, no rash, no leg swelling and no claudication. Associated medical issues do not include CAD or heart failure. Chest Pain (Angina)    The history is provided by the patient. This is a recurrent problem. The problem has been gradually worsening. The problem occurs every several days. The pain is associated with exertion. The pain is present in the substernal region. The pain is at a severity of 4/10. The pain is moderate. The quality of the pain is described as tightness. The pain radiates to the left neck and left jaw. Associated symptoms include malaise/fatigue, PND and shortness of breath. Pertinent negatives include no abdominal pain, no claudication, no cough, no diaphoresis, no dizziness, no fever, no headaches, no hemoptysis, no nausea, no orthopnea, no palpitations, no sputum production, no vomiting and no weakness. Risk factors include family history, smoking/tobacco exposure, hypertension and male gender. His past medical history is significant for HTN. Pertinent negatives include no cardiac catheterization, no echocardiogram and no stress thallium. Review of Systems   Constitutional: Positive for malaise/fatigue.  Negative for chills, diaphoresis, fever and weight loss.   HENT: Negative for congestion, ear discharge, ear pain, hearing loss, nosebleeds and tinnitus. Eyes: Negative for blurred vision. Respiratory: Positive for shortness of breath. Negative for cough, hemoptysis, sputum production, wheezing and stridor. Cardiovascular: Positive for chest pain and PND. Negative for palpitations, orthopnea, claudication and leg swelling. Gastrointestinal: Negative for abdominal pain, heartburn, nausea and vomiting. Musculoskeletal: Negative for myalgias and neck pain. Skin: Negative for itching and rash. Neurological: Negative for dizziness, tingling, tremors, focal weakness, loss of consciousness, weakness and headaches. Psychiatric/Behavioral: Negative for depression and suicidal ideas. Family History   Problem Relation Age of Onset    No Known Problems Mother     Heart Attack Father     Stroke Brother        Past Medical History:   Diagnosis Date    Chronic lung disease        Past Surgical History:   Procedure Laterality Date    HX HERNIA REPAIR         Social History   Substance Use Topics    Smoking status: Current Every Day Smoker     Packs/day: 1.00     Years: 35.00     Start date: 7/11/1973    Smokeless tobacco: Never Used    Alcohol use Yes       Allergies   Allergen Reactions    Nalbuphine Other (comments)     Extremely nauseous    Seasonale  [Levonorgestrel-Ethinyl Estrad] Other (comments)       Outpatient Prescriptions Marked as Taking for the 8/27/18 encounter (Office Visit) with Mario Prajapati MD   Medication Sig Dispense Refill    LISINOPRIL PO Take  by mouth.  metoprolol tartrate (LOPRESSOR) 25 mg tablet Take 0.5 Tabs by mouth two (2) times a day. 60 Tab 0    amLODIPine (NORVASC) 2.5 mg tablet Take 1 Tab by mouth daily. 30 Tab 0    albuterol (PROVENTIL HFA, VENTOLIN HFA, PROAIR HFA) 90 mcg/actuation inhaler Take 2 Puffs by inhalation every four (4) hours as needed for Wheezing or Shortness of Breath.       umeclidinium-vilanterol (ANORO ELLIPTA) 62.5-25 mcg/actuation inhaler Take 1 Puff by inhalation daily. 1 Inhaler 11        Visit Vitals    /76 (BP 1 Location: Right arm, BP Patient Position: Standing)    Pulse (!) 107    Ht 5' 7\" (1.702 m)    Wt 82.1 kg (181 lb)    BMI 28.35 kg/m2     Physical Exam   Constitutional: He is oriented to person, place, and time. He appears well-developed and well-nourished. No distress. HENT:   Head: Atraumatic. Mouth/Throat: No oropharyngeal exudate. Eyes: Conjunctivae are normal. Right eye exhibits no discharge. Left eye exhibits no discharge. No scleral icterus. Neck: Normal range of motion. Neck supple. No JVD present. No tracheal deviation present. No thyromegaly present. Cardiovascular: Normal rate and regular rhythm. Exam reveals no gallop. No murmur heard. Pulmonary/Chest: Effort normal and breath sounds normal. No stridor. He has no wheezes. He has no rales. Abdominal: Soft. There is no tenderness. There is no rebound and no guarding. Musculoskeletal: Normal range of motion. He exhibits no edema or tenderness. Lymphadenopathy:     He has no cervical adenopathy. Neurological: He is alert and oriented to person, place, and time. He exhibits normal muscle tone. Skin: Skin is warm. He is not diaphoretic. Psychiatric: He has a normal mood and affect. His behavior is normal.     ekg sinus rhythm with no acute st-t changes  Echo and lexiscan from 2012 reviewed  ASSESSMENT and PLAN    ICD-10-CM ICD-9-CM    1. Pulmonary emphysema, unspecified [facility] ([facility] J43.9 492.8    2. Essential hypertension I10 401.9    3. Smoker F17.200 305.1    4. Intermediate coronary syndrome (HCC) B72.6 089.7 METABOLIC PANEL, COMPREHENSIVE      PROTHROMBIN TIME + INR      PTT      TSH 3RD GENERATION      CBC W/O DIFF      CASE REQUEST CATH LAB   5.  Family history of premature CAD Z82.49 V17.3      Orders Placed This Encounter    METABOLIC PANEL, COMPREHENSIVE Standing Status:   Future     Standing Expiration Date:   8/28/2019    PROTHROMBIN TIME + INR     Standing Status:   Future     Standing Expiration Date:   8/28/2019    PTT     Standing Status:   Future     Standing Expiration Date:   8/28/2019    TSH 3RD GENERATION     Standing Status:   Future     Standing Expiration Date:   8/28/2019    CBC W/O DIFF     Standing Status:   Future     Standing Expiration Date:   8/28/2019    metoprolol tartrate (LOPRESSOR) 25 mg tablet     Sig: Take 0.5 Tabs by mouth two (2) times a day. Dispense:  60 Tab     Refill:  0    amLODIPine (NORVASC) 2.5 mg tablet     Sig: Take 1 Tab by mouth daily. Dispense:  30 Tab     Refill:  0     Follow-up Disposition:  Return in about 3 weeks (around 9/17/2018). very strongly urged to quit smoking to reduce cardiovascular risk  cardiovascular risk and specific lipid/LDL goals reviewed  use of aspirin to prevent MI and TIA's discussed. Patient with long standing h/o smoking and family h/o premature CAD seen for chest pain- tightness radiating to left jaw associated with mild sob. Symptoms lasting 15 mins relieved with rest.  Discussed with patient at length regarding further management.   Due to worsening symptoms and multiple risk factors, will proceed with LHC  Risks, benefits and alternatives of LHC/ptca/stent explained to patient  All questions answered

## 2018-08-27 NOTE — MR AVS SNAPSHOT
[address] [facility] 
 
 
 [address] [facility] 
[phone] Patient: iAde Baird MRN: [id number] [facility]:[id number] Visit Information Date & Time Provider Department Dept. Phone Encounter #  
 8/27/2018 10:00 AM Jory Posey MD [facility] [place] [id number] Follow-up Instructions Return in about 3 weeks (around 9/17/2018). Follow-up and Disposition History Upcoming Health Maintenance Date Due Hepatitis C Screening 1960 Pneumococcal 19-64 Medium Risk (1 of 1 - PPSV23) 8/24/1980 DTaP/Tdap/Td series (1 - Tdap) 8/24/1982 FOBT Q 1 YEAR AGE 50-75 8/24/2011 Influenza Age 5 to Adult 8/1/2018 Allergies as of 8/27/2018  Review Complete On: 8/27/2018 By: Cris Garcia Severity Noted Reaction Type Reactions Nalbuphine  04/07/2011    Other (comments) Extremely nauseous Seasonale  [Levonorgestrel-ethinyl Estrad]  04/07/2011    Other (comments) Current Immunizations  Never Reviewed No immunizations on file. Not reviewed this visit You Were Diagnosed With   
  
 Codes Comments Pulmonary emphysema, unspecified emphyse[facility] type [facility]    -  Primary ICD-10-CM: J43.9 ICD-9-CM: 492.8 Essential hypertension     ICD-10-CM: I10 
ICD-9-CM: 401.9 Smoker     ICD-10-CM: Z96.608 ICD-9-CM: 305.1 Intermediate coronary syndrome (HCC)     ICD-10-CM: I20.0 ICD-9-CM: 411.1 Family history of premature CAD     ICD-10-CM: Z82.49 
ICD-9-CM: V17.3 Vitals BP Pulse Height(growth percentile) Weight(growth percentile) BMI Smoking Status 109/76 (BP 1 Location: Right arm, BP Patient Position: Standing) (!) 107 5' 7\" (1.702 m) 181 lb (82.1 kg) 28.35 kg/m2 Current Every Day Smoker Vitals History BMI and BSA Data Body Mass Index Body Surface Area  
 28.35 kg/m 2 1.97 m 2 Preferred Pharmacy Pharmacy Name Phone [address], [address] [facility] Your Updated Medication List  
  
   
This list is accurate as of 8/27/18 11:20 AM.  Always use your most recent med list.  
  
  
  
  
 albuterol 90 mcg/actuation inhaler Commonly known as:  PROVENTIL HFA, VENTOLIN HFA, PROAIR HFA Take 2 Puffs by inhalation every four (4) hours as needed for Wheezing or Shortness of Breath. amLODIPine 2.5 mg tablet Commonly known as:  [place] Royals Take 1 Tab by mouth daily. FLEXERIL 10 mg tablet Generic drug:  cyclobenzaprine Take 10 mg by mouth nightly. LISINOPRIL PO Take  by mouth.  
  
 methadone 10 mg tablet Commonly known as:  DOLOPHINE  
1 po q 8 hrs for chronic pain. metoprolol tartrate 25 mg tablet Commonly known as:  LOPRESSOR Take 0.5 Tabs by mouth two (2) times a day. umeclidinium-vilanterol 62.5-25 mcg/actuation inhaler Commonly known as:  Aleda Judy Take 1 Puff by inhalation daily. Prescriptions Sent to Pharmacy Refills  
 metoprolol tartrate (LOPRESSOR) 25 mg tablet 0 Sig: Take 0.5 Tabs by mouth two (2) times a day. Class: Normal  
 Pharmacy: [facility] [address] Ph #: [phone] Route: Oral  
 amLODIPine (NORVASC) 2.5 mg tablet 0 Sig: Take 1 Tab by mouth daily. Class: Normal  
 Pharmacy: [facility] [address] [address] [place] Ph #: [phone] Route: Oral  
  
We Performed the Following CASE REQUEST CATH LAB [EOG7415 CPT(R)] Follow-up Instructions Return in about 3 weeks (around 9/17/2018). To-Do List   
 09/07/2018 Lab:  CBC W/O DIFF   
  
 09/07/2018 Lab:  METABOLIC PANEL, COMPREHENSIVE   
  
 09/07/2018 Lab:  PROTHROMBIN TIME + INR   
  
 09/07/2018 Lab:  PTT   
  
 09/07/2018 Lab:  TSH 3RD GENERATION Patient Instructions High Blood Pressure: Care Instructions Your Care Instructions If your blood pressure is usually above 130/80, you have high blood pressure, or hypertension. That means the top number is 130 or higher or the bottom number is 80 or higher, or both. Despite what a lot of people think, high blood pressure usually doesn't cause headaches or make you feel dizzy or lightheaded. It usually has no symptoms. But it does increase your risk for heart attack, stroke, and kidney or eye damage. The higher your blood pressure, the more your risk increases. Your doctor will give you a goal for your blood pressure. Your goal will be based on your health and your age. Lifestyle changes, such as eating healthy and being active, are always important to help lower blood pressure. You might also take medicine to reach your blood pressure goal. 
Follow-up care is a key part of your treatment and safety. Be sure to make and go to all appointments, and call your doctor if you are having problems. It's also a good idea to know your test results and keep a list of the medicines you take. How can you care for yourself at home? Medical treatment · If you stop taking your medicine, your blood pressure will go back up. You may take one or more types of medicine to lower your blood pressure. Be safe with medicines. Take your medicine exactly as prescribed. Call your doctor if you think you are having a problem with your medicine. · Talk to your doctor before you start taking aspirin every day. Aspirin can help certain people lower their risk of a heart attack or stroke. But taking aspirin isn't right for everyone, because it can cause serious bleeding. · See your doctor regularly. You may need to see the doctor more often at first or until your blood pressure comes down. · If you are taking blood pressure medicine, talk to your doctor before you take decongestants or anti-inflammatory medicine, such as ibuprofen. Some of these medicines can raise blood pressure. · Learn how to check your blood pressure at home. Lifestyle changes · Stay at a healthy weight. This is especially important if you put on weight around the waist. Losing even 10 pounds can help you lower your blood pressure. · If your doctor recommends it, get more exercise. Walking is a good choice. Bit by bit, increase the amount you walk every day. Try for at least 30 minutes on most days of the week. You also may want to swim, bike, or do other activities. · Avoid or limit alcohol. Talk to your doctor about whether you can drink any alcohol. · Try to limit how much sodium you eat to less than 2,300 milligrams (mg) a day. Your doctor may ask you to try to eat less than 1,500 mg a day. · Eat plenty of fruits (such as bananas and oranges), vegetables, legumes, whole grains, and low-fat dairy products. · Lower the amount of saturated fat in your diet. Saturated fat is found in animal products such as milk, cheese, and meat. Limiting these foods may help you lose weight and also lower your risk for heart disease. · Do not smoke. Smoking increases your risk for heart attack and stroke. If you need help quitting, talk to your doctor about stop-smoking programs and medicines. These can increase your chances of quitting for good. When should you call for help? Call 911 anytime you think you may need emergency care. This may mean having symptoms that suggest that your blood pressure is causing a serious heart or blood vessel problem. Your blood pressure may be over 180/110. 
 For example, call 911 if: 
  · You have symptoms of a heart attack. These may include: ¨ Chest pain or pressure, or a strange feeling in the chest. 
¨ Sweating. ¨ Shortness of breath. ¨ Nausea or vomiting. ¨ Pain, pressure, or a strange feeling in the back, neck, jaw, or upper belly or in one or both shoulders or arms. ¨ Lightheadedness or sudden weakness. ¨ A fast or irregular heartbeat.   · You have symptoms of a stroke. These may include: 
¨ Sudden numbness, tingling, weakness, or loss of movement in your face, arm, or leg, especially on only one side of your body. ¨ Sudden vision changes. ¨ Sudden trouble speaking. ¨ Sudden confusion or trouble understanding simple statements. ¨ Sudden problems with walking or balance. ¨ A sudden, severe headache that is different from past headaches.  
  · You have severe back or belly pain.  
 Do not wait until your blood pressure comes down on its own. Get help right away. 
 Call your doctor now or seek immediate care if: 
  · Your blood pressure is much higher than normal (such as 180/110 or higher), but you don't have symptoms.  
  · You think high blood pressure is causing symptoms, such as: ¨ Severe headache. ¨ Blurry vision.  
 Watch closely for changes in your health, and be sure to contact your doctor if: 
  · Your blood pressure measures 140/90 or higher at least 2 times. That means the top number is 140 or higher or the bottom number is 90 or higher, or both.  
  · You think you may be having side effects from your blood pressure medicine.  
  · Your blood pressure is usually normal, but it goes above normal at least 2 times. Where can you learn more? Go to http://halina-mikei.info/. Enter F550 in the search box to learn more about \"High Blood Pressure: Care Instructions. \" Current as of: December 6, 2017 Content Version: 11.7 © 5805-8353 [employer]. Care instructions adapted under license by [employer] (which disclaims liability or warranty for this information). If you have questions about a medical condition or this instruction, always ask your healthcare professional. [place] [address] any warranty or liability for your use of this information. Instructions Patients Name:  Yumiko Guy 1. You are scheduled to have a Cath on 9/10/18  at [facility] Please check in at 7:00   . 2. Please go to [facility] and park in the outpatient parking lot that is located around to the back of the hospital and enter through the [facility] building. Once you enter through the [facility] check in with the  there. The  will either give you directions or assist you in getting to the cath holding area. 3. You are not to eat anything after midnight before the procedure. Please continue to drink fluids up until 12:00.  (water, juice, black coffee, soft drinks). Do not drink milk or milk products or anything with cream. You may take medications(except for diabetes) with a small sip of water before 6am on the day of the procedure. Uvaldo Pearl 4. If you are diabetic, do not take your insulin/sugar pill the morning of the procedure. 5. MEDICATION INSTRUCTIONS:   Please take your morning medications with the following special instructions: 
 
[x]          Please make sure to take your Blood pressure medication : With just enough water to swallow. [x]          Take your Aspirin and/or Plavix. With just enough water to swallow. []          Stop your Coumadin on   and do not resume it until after the procedure.  
 
[]          Take Prednisone 60 mg and Benadryl 25 mg by mouth at Bedtime on  and again on  at . This is to prevent you from having an allergic reaction to the dye. 6. We encourage families to wait in the waiting room on the first floor while the procedure is being done. The Doctor will come out and talk with you as soon as the procedure is over. 7. There is the possibility that you may spend the night in the hospital, depending on the results of the procedure. This will be determined after the procedure is done. If angioplasty or stent is planned, you will stay at least one day. 8. If you or your family have any questions, please call our office Monday Friday, 9:00 a. m.4:30 p.m.,  At [phone], and ask to speak to one of the nurses. Patient Instructions History Introducing [facility]! [facility] introduces Demohour patient portal. Now you can access parts of your medical record, email your doctor's office, and request medication refills online. 1. In your internet browser, go to [email or web address] 2. Click on the First Time User? Click Here link in the Sign In box. You will see the New Member Sign Up page. 3. Enter your Demohour Access Code exactly as it appears below. You will not need to use this code after youve completed the sign-up process. If you do not sign up before the expiration date, you must request a new code. · Demohour Access Code: [email or web address] Expires: 10/9/2018  2:11 PM 
 
4. Enter the last four digits of your Social Security Number (xxxx) and Date of Birth (mm/dd/yyyy) as indicated and click Submit. You will be taken to the next sign-up page. 5. Create a Demohour ID. This will be your Demohour login ID and cannot be changed, so think of one that is secure and easy to remember. 6. Create a Demohour password. You can change your password at any time. 7. Enter your Password Reset Question and Answer. This can be used at a later time if you forget your password. 8. Enter your e-mail address. You will receive e-mail notification when new information is available in [address] 9. Click Sign Up. You can now view and download portions of your medical record. 10. Click the Download Summary menu link to download a portable copy of your medical information. If you have questions, please visit the Frequently Asked Questions section of the Demohour website. Remember, Demohour is NOT to be used for urgent needs. For medical emergencies, dial 911. Now available from your iPhone and Android! Please provide this summary of care documentation to your next provider. Your primary care clinician is listed as Rene Loza. If you have any questions after today's visit, please call [phone].

## 2018-08-27 NOTE — PATIENT INSTRUCTIONS
High Blood Pressure: Care Instructions  Your Care Instructions    If your blood pressure is usually above 130/80, you have high blood pressure, or hypertension. That means the top number is 130 or higher or the bottom number is 80 or higher, or both. Despite what a lot of people think, high blood pressure usually doesn't cause headaches or make you feel dizzy or lightheaded. It usually has no symptoms. But it does increase your risk for heart attack, stroke, and kidney or eye damage. The higher your blood pressure, the more your risk increases. Your doctor will give you a goal for your blood pressure. Your goal will be based on your health and your age. Lifestyle changes, such as eating healthy and being active, are always important to help lower blood pressure. You might also take medicine to reach your blood pressure goal.  Follow-up care is a key part of your treatment and safety. Be sure to make and go to all appointments, and call your doctor if you are having problems. It's also a good idea to know your test results and keep a list of the medicines you take. How can you care for yourself at home? Medical treatment  · If you stop taking your medicine, your blood pressure will go back up. You may take one or more types of medicine to lower your blood pressure. Be safe with medicines. Take your medicine exactly as prescribed. Call your doctor if you think you are having a problem with your medicine. · Talk to your doctor before you start taking aspirin every day. Aspirin can help certain people lower their risk of a heart attack or stroke. But taking aspirin isn't right for everyone, because it can cause serious bleeding. · See your doctor regularly. You may need to see the doctor more often at first or until your blood pressure comes down. · If you are taking blood pressure medicine, talk to your doctor before you take decongestants or anti-inflammatory medicine, such as ibuprofen.  Some of these medicines can raise blood pressure. · Learn how to check your blood pressure at home. Lifestyle changes  · Stay at a healthy weight. This is especially important if you put on weight around the waist. Losing even 10 pounds can help you lower your blood pressure. · If your doctor recommends it, get more exercise. Walking is a good choice. Bit by bit, increase the amount you walk every day. Try for at least 30 minutes on most days of the week. You also may want to swim, bike, or do other activities. · Avoid or limit alcohol. Talk to your doctor about whether you can drink any alcohol. · Try to limit how much sodium you eat to less than 2,300 milligrams (mg) a day. Your doctor may ask you to try to eat less than 1,500 mg a day. · Eat plenty of fruits (such as bananas and oranges), vegetables, legumes, whole grains, and low-fat dairy products. · Lower the amount of saturated fat in your diet. Saturated fat is found in animal products such as milk, cheese, and meat. Limiting these foods may help you lose weight and also lower your risk for heart disease. · Do not smoke. Smoking increases your risk for heart attack and stroke. If you need help quitting, talk to your doctor about stop-smoking programs and medicines. These can increase your chances of quitting for good. When should you call for help? Call 911 anytime you think you may need emergency care. This may mean having symptoms that suggest that your blood pressure is causing a serious heart or blood vessel problem. Your blood pressure may be over 180/110.   For example, call 911 if:    · You have symptoms of a heart attack. These may include:  ¨ Chest pain or pressure, or a strange feeling in the chest.  ¨ Sweating. ¨ Shortness of breath. ¨ Nausea or vomiting. ¨ Pain, pressure, or a strange feeling in the back, neck, jaw, or upper belly or in one or both shoulders or arms. ¨ Lightheadedness or sudden weakness.   ¨ A fast or irregular heartbeat.     · You have symptoms of a stroke. These may include:  ¨ Sudden numbness, tingling, weakness, or loss of movement in your face, arm, or leg, especially on only one side of your body. ¨ Sudden vision changes. ¨ Sudden trouble speaking. ¨ Sudden confusion or trouble understanding simple statements. ¨ Sudden problems with walking or balance. ¨ A sudden, severe headache that is different from past headaches.     · You have severe back or belly pain.    Do not wait until your blood pressure comes down on its own. Get help right away.   Call your doctor now or seek immediate care if:    · Your blood pressure is much higher than normal (such as 180/110 or higher), but you don't have symptoms.     · You think high blood pressure is causing symptoms, such as:  ¨ Severe headache. ¨ Blurry vision.    Watch closely for changes in your health, and be sure to contact your doctor if:    · Your blood pressure measures 140/90 or higher at least 2 times. That means the top number is 140 or higher or the bottom number is 90 or higher, or both.     · You think you may be having side effects from your blood pressure medicine.     · Your blood pressure is usually normal, but it goes above normal at least 2 times. Where can you learn more? Go to http://halina-mikie.info/. Enter A293 in the search box to learn more about \"High Blood Pressure: Care Instructions. \"  Current as of: December 6, 2017  Content Version: 11.7  © 3085-4637 [employer]. Care instructions adapted under license by [employer] (which disclaims liability or warranty for this information). If you have questions about a medical condition or this instruction, always ask your healthcare professional. [place] [address] any warranty or liability for your use of this information. Instructions    Patients Name:  Sharonda Suárez    1.  You are scheduled to have a Cath on 9/10/18  at [facility] Please check in at 7:00   .     2. Please go to [facility] and park in the outpatient parking lot that is located around to the back of the hospital and enter through the [facility] building. Once you enter through the [facility] check in with the  there. The  will either give you directions or assist you in getting to the cath holding area. 3. You are not to eat anything after midnight before the procedure. Please continue to drink fluids up until 12:00.  (water, juice, black coffee, soft drinks). Do not drink milk or milk products or anything with cream. You may take medications(except for diabetes) with a small sip of water before 6am on the day of the procedure. .    4. If you are diabetic, do not take your insulin/sugar pill the morning of the procedure. 5. MEDICATION INSTRUCTIONS:   Please take your morning medications with the following special instructions:    [x]          Please make sure to take your Blood pressure medication : With just enough water to swallow. [x]          Take your Aspirin and/or Plavix. With just enough water to swallow. []          Stop your Coumadin on   and do not resume it until after the procedure.     []          Take Prednisone 60 mg and Benadryl 25 mg by mouth at Bedtime on  and again on  at . This is to prevent you from having an allergic reaction to the dye. 6. We encourage families to wait in the waiting room on the first floor while the procedure is being done. The Doctor will come out and talk with you as soon as the procedure is over. 7. There is the possibility that you may spend the night in the hospital, depending on the results of the procedure. This will be determined after the procedure is done. If angioplasty or stent is planned, you will stay at least one day.     8. If you or your family have any questions, please call our office Monday -Friday, 9:00 a.m.-4:30 p.m.,  At [phone], and ask to speak to one of the nurses.

## 2018-09-13 ENCOUNTER — HOSPITAL ENCOUNTER (OUTPATIENT)
Dept: PREADMISSION TESTING | Age: 58
Discharge: HOME | End: 2018-09-13
Payer: SELF-PAY

## 2018-09-13 DIAGNOSIS — I20.0 INTERMEDIATE CORONARY SYNDROME (HCC): ICD-10-CM

## 2018-09-13 LAB
ALBUMIN SERPL-MCNC: 3.4 G/DL (ref 3.4–5)
ALBUMIN/GLOB SERPL: 1 {RATIO} (ref 0.8–1.7)
ALP SERPL-CCNC: 85 U/L (ref 45–117)
ALT SERPL-CCNC: 25 U/L (ref 16–61)
ANION GAP SERPL CALC-SCNC: 9 MMOL/L (ref 3–18)
APTT PPP: 29.6 SEC (ref 23–36.4)
AST SERPL-CCNC: 9 U/L (ref 15–37)
BILIRUB SERPL-MCNC: 0.4 MG/DL (ref 0.2–1)
BUN SERPL-MCNC: 12 MG/DL (ref 7–18)
BUN/CREAT SERPL: 14 (ref 12–20)
CALCIUM SERPL-MCNC: 8.1 MG/DL (ref 8.5–10.1)
CHLORIDE SERPL-SCNC: 104 MMOL/L (ref 100–108)
CO2 SERPL-SCNC: 26 MMOL/L (ref 21–32)
CREAT SERPL-MCNC: 0.88 MG/DL (ref 0.6–1.3)
ERYTHROCYTE [DISTWIDTH] IN BLOOD BY AUTOMATED COUNT: 13.8 % (ref 11.6–14.5)
GLOBULIN SER CALC-MCNC: 3.3 G/DL (ref 2–4)
GLUCOSE SERPL-MCNC: 161 MG/DL (ref 74–99)
HCT VFR BLD AUTO: 42.6 % (ref 36–48)
HGB BLD-MCNC: 15 G/DL (ref 13–16)
INR PPP: 0.9 (ref 0.8–1.2)
MCH RBC QN AUTO: 32.1 PG (ref 24–34)
MCHC RBC AUTO-ENTMCNC: 35.2 G/DL (ref 31–37)
MCV RBC AUTO: 91.2 FL (ref 74–97)
PLATELET # BLD AUTO: 218 K/UL (ref 135–420)
PMV BLD AUTO: 11.2 FL (ref 9.2–11.8)
POTASSIUM SERPL-SCNC: 3.8 MMOL/L (ref 3.5–5.5)
PROT SERPL-MCNC: 6.7 G/DL (ref 6.4–8.2)
PROTHROMBIN TIME: 12.3 SEC (ref 11.5–15.2)
RBC # BLD AUTO: 4.67 M/UL (ref 4.7–5.5)
SODIUM SERPL-SCNC: 139 MMOL/L (ref 136–145)
TSH SERPL DL<=0.05 MIU/L-ACNC: 0.56 UIU/ML (ref 0.36–3.74)
WBC # BLD AUTO: 10.1 K/UL (ref 4.6–13.2)

## 2018-09-13 PROCEDURE — 85610 PROTHROMBIN TIME: CPT | Performed by: INTERNAL MEDICINE

## 2018-09-13 PROCEDURE — 85027 COMPLETE CBC AUTOMATED: CPT | Performed by: INTERNAL MEDICINE

## 2018-09-13 PROCEDURE — 85730 THROMBOPLASTIN TIME PARTIAL: CPT | Performed by: INTERNAL MEDICINE

## 2018-09-13 PROCEDURE — 36415 COLL VENOUS BLD VENIPUNCTURE: CPT | Performed by: INTERNAL MEDICINE

## 2018-09-13 PROCEDURE — 84443 ASSAY THYROID STIM HORMONE: CPT | Performed by: INTERNAL MEDICINE

## 2018-09-13 PROCEDURE — 80053 COMPREHEN METABOLIC PANEL: CPT | Performed by: INTERNAL MEDICINE

## 2018-09-21 ENCOUNTER — HOSPITAL ENCOUNTER (OUTPATIENT)
Age: 58
Setting detail: OBSERVATION
Discharge: HOME | End: 2018-09-21
Attending: INTERNAL MEDICINE | Admitting: INTERNAL MEDICINE
Payer: SELF-PAY

## 2018-09-21 VITALS
OXYGEN SATURATION: 98 % | HEIGHT: 67 IN | SYSTOLIC BLOOD PRESSURE: 147 MMHG | HEART RATE: 90 BPM | DIASTOLIC BLOOD PRESSURE: 91 MMHG | WEIGHT: 180 LBS | RESPIRATION RATE: 31 BRPM | BODY MASS INDEX: 28.25 KG/M2

## 2018-09-21 DIAGNOSIS — I20.0: ICD-10-CM

## 2018-09-21 PROBLEM — I25.10 CAD (CORONARY ARTERY DISEASE): Status: ACTIVE | Noted: 2018-09-21

## 2018-09-21 LAB — END DIASTOLIC PRESSURE: 12

## 2018-09-21 PROCEDURE — 99218 HC RM OBSERVATION: CPT

## 2018-09-21 PROCEDURE — 74011250637 HC RX REV CODE- 250/637: Performed by: INTERNAL MEDICINE

## 2018-09-21 PROCEDURE — 85347 COAGULATION TIME ACTIVATED: CPT

## 2018-09-21 PROCEDURE — C1769 GUIDE WIRE: HCPCS | Performed by: INTERNAL MEDICINE

## 2018-09-21 PROCEDURE — C1894 INTRO/SHEATH, NON-LASER: HCPCS | Performed by: INTERNAL MEDICINE

## 2018-09-21 PROCEDURE — 74011250636 HC RX REV CODE- 250/636

## 2018-09-21 PROCEDURE — 77030019569 HC BND COMPR RAD TERU -B: Performed by: INTERNAL MEDICINE

## 2018-09-21 PROCEDURE — C1887 CATHETER, GUIDING: HCPCS | Performed by: INTERNAL MEDICINE

## 2018-09-21 PROCEDURE — 74011250636 HC RX REV CODE- 250/636: Performed by: INTERNAL MEDICINE

## 2018-09-21 PROCEDURE — 77030012468 HC VLV BLEEDBK CNTRL ABBT -B: Performed by: INTERNAL MEDICINE

## 2018-09-21 PROCEDURE — 92928 PRQ TCAT PLMT NTRAC ST 1 LES: CPT | Performed by: INTERNAL MEDICINE

## 2018-09-21 PROCEDURE — C1725 CATH, TRANSLUMIN NON-LASER: HCPCS | Performed by: INTERNAL MEDICINE

## 2018-09-21 PROCEDURE — 77030016699 HC CATH ANGI DX INFN1 CARD -A: Performed by: INTERNAL MEDICINE

## 2018-09-21 PROCEDURE — 74011000250 HC RX REV CODE- 250: Performed by: INTERNAL MEDICINE

## 2018-09-21 PROCEDURE — 99153 MOD SED SAME PHYS/QHP EA: CPT | Performed by: INTERNAL MEDICINE

## 2018-09-21 PROCEDURE — C1874 STENT, COATED/COV W/DEL SYS: HCPCS | Performed by: INTERNAL MEDICINE

## 2018-09-21 PROCEDURE — 99152 MOD SED SAME PHYS/QHP 5/>YRS: CPT | Performed by: INTERNAL MEDICINE

## 2018-09-21 PROCEDURE — 77030015766: Performed by: INTERNAL MEDICINE

## 2018-09-21 PROCEDURE — 93458 L HRT ARTERY/VENTRICLE ANGIO: CPT | Performed by: INTERNAL MEDICINE

## 2018-09-21 PROCEDURE — 77030013797 HC KT TRNSDUC PRSSR EDWD -A: Performed by: INTERNAL MEDICINE

## 2018-09-21 PROCEDURE — 74011636320 HC RX REV CODE- 636/320: Performed by: INTERNAL MEDICINE

## 2018-09-21 DEVICE — EVEROLIMUS-ELUTING PLATINUM CHROMIUM CORONARY STENT SYSTEM
Type: IMPLANTABLE DEVICE | Status: FUNCTIONAL
Brand: SYNERGY™

## 2018-09-21 RX ORDER — GUAIFENESIN 100 MG/5ML
LIQUID (ML) ORAL
Status: DISCONTINUED
Start: 2018-09-21 | End: 2018-09-21 | Stop reason: HOSPADM

## 2018-09-21 RX ORDER — HYDRALAZINE HYDROCHLORIDE 50 MG/1
50 TABLET, FILM COATED ORAL ONCE
Status: COMPLETED | OUTPATIENT
Start: 2018-09-21 | End: 2018-09-21

## 2018-09-21 RX ORDER — GUAIFENESIN 100 MG/5ML
81 LIQUID (ML) ORAL
Status: COMPLETED | OUTPATIENT
Start: 2018-09-21 | End: 2018-09-21

## 2018-09-21 RX ORDER — MIDAZOLAM HYDROCHLORIDE 1 MG/ML
INJECTION, SOLUTION INTRAMUSCULAR; INTRAVENOUS AS NEEDED
Status: DISCONTINUED | OUTPATIENT
Start: 2018-09-21 | End: 2018-09-21 | Stop reason: HOSPADM

## 2018-09-21 RX ORDER — GUAIFENESIN 100 MG/5ML
81 LIQUID (ML) ORAL DAILY
Status: DISCONTINUED | OUTPATIENT
Start: 2018-09-22 | End: 2018-09-21 | Stop reason: HOSPADM

## 2018-09-21 RX ORDER — LISINOPRIL 10 MG/1
10 TABLET ORAL DAILY
Qty: 30 TAB | Refills: 4 | Status: SHIPPED | OUTPATIENT
Start: 2018-09-21 | End: 2020-01-22 | Stop reason: SDUPTHER

## 2018-09-21 RX ORDER — FAMOTIDINE 10 MG/ML
10 INJECTION INTRAVENOUS AS NEEDED
Status: COMPLETED | OUTPATIENT
Start: 2018-09-21 | End: 2018-09-21

## 2018-09-21 RX ORDER — LIDOCAINE HYDROCHLORIDE 10 MG/ML
INJECTION, SOLUTION EPIDURAL; INFILTRATION; INTRACAUDAL; PERINEURAL AS NEEDED
Status: DISCONTINUED | OUTPATIENT
Start: 2018-09-21 | End: 2018-09-21 | Stop reason: HOSPADM

## 2018-09-21 RX ORDER — FAMOTIDINE 10 MG/ML
INJECTION INTRAVENOUS AS NEEDED
Status: DISCONTINUED | OUTPATIENT
Start: 2018-09-21 | End: 2018-09-21 | Stop reason: HOSPADM

## 2018-09-21 RX ORDER — VERAPAMIL HYDROCHLORIDE 2.5 MG/ML
INJECTION, SOLUTION INTRAVENOUS AS NEEDED
Status: DISCONTINUED | OUTPATIENT
Start: 2018-09-21 | End: 2018-09-21 | Stop reason: HOSPADM

## 2018-09-21 RX ORDER — SODIUM CHLORIDE 0.9 % (FLUSH) 0.9 %
5-10 SYRINGE (ML) INJECTION EVERY 8 HOURS
Status: DISCONTINUED | OUTPATIENT
Start: 2018-09-21 | End: 2018-09-21 | Stop reason: HOSPADM

## 2018-09-21 RX ORDER — CLOPIDOGREL BISULFATE 75 MG/1
75 TABLET ORAL DAILY
Qty: 30 TAB | Refills: 6 | Status: SHIPPED | OUTPATIENT
Start: 2018-09-21 | End: 2019-08-14 | Stop reason: SDUPTHER

## 2018-09-21 RX ORDER — FAMOTIDINE 10 MG/ML
10 INJECTION INTRAVENOUS AS NEEDED
Status: DISCONTINUED | OUTPATIENT
Start: 2018-09-21 | End: 2018-09-21

## 2018-09-21 RX ORDER — AMLODIPINE BESYLATE 10 MG/1
10 TABLET ORAL DAILY
Qty: 30 TAB | Refills: 4 | Status: SHIPPED | OUTPATIENT
Start: 2018-09-21 | End: 2019-10-30

## 2018-09-21 RX ORDER — SODIUM CHLORIDE 9 MG/ML
INJECTION, SOLUTION INTRAVENOUS
Status: DISCONTINUED | OUTPATIENT
Start: 2018-09-21 | End: 2018-09-21 | Stop reason: HOSPADM

## 2018-09-21 RX ORDER — SODIUM CHLORIDE 0.9 % (FLUSH) 0.9 %
5-10 SYRINGE (ML) INJECTION AS NEEDED
Status: DISCONTINUED | OUTPATIENT
Start: 2018-09-21 | End: 2018-09-21 | Stop reason: HOSPADM

## 2018-09-21 RX ORDER — CLOPIDOGREL BISULFATE 75 MG/1
75 TABLET ORAL DAILY
Status: DISCONTINUED | OUTPATIENT
Start: 2018-09-21 | End: 2018-09-21 | Stop reason: HOSPADM

## 2018-09-21 RX ORDER — FENTANYL CITRATE 50 UG/ML
INJECTION, SOLUTION INTRAMUSCULAR; INTRAVENOUS AS NEEDED
Status: DISCONTINUED | OUTPATIENT
Start: 2018-09-21 | End: 2018-09-21 | Stop reason: HOSPADM

## 2018-09-21 RX ORDER — CLOPIDOGREL BISULFATE 75 MG/1
75 TABLET ORAL DAILY
Qty: 30 TAB | Refills: 6 | Status: SHIPPED | OUTPATIENT
Start: 2018-09-21 | End: 2018-09-21

## 2018-09-21 RX ORDER — GUAIFENESIN 100 MG/5ML
81 LIQUID (ML) ORAL DAILY
Qty: 90 TAB | Refills: 3 | Status: SHIPPED | OUTPATIENT
Start: 2018-09-21 | End: 2022-04-08

## 2018-09-21 RX ORDER — SIMVASTATIN 40 MG/1
40 TABLET, FILM COATED ORAL
Status: DISCONTINUED | OUTPATIENT
Start: 2018-09-21 | End: 2018-09-21 | Stop reason: HOSPADM

## 2018-09-21 RX ORDER — SIMVASTATIN 40 MG/1
40 TABLET, FILM COATED ORAL
Qty: 30 TAB | Refills: 4 | Status: SHIPPED | OUTPATIENT
Start: 2018-09-21 | End: 2019-10-30

## 2018-09-21 RX ORDER — HEPARIN SODIUM 1000 [USP'U]/ML
INJECTION, SOLUTION INTRAVENOUS; SUBCUTANEOUS AS NEEDED
Status: DISCONTINUED | OUTPATIENT
Start: 2018-09-21 | End: 2018-09-21 | Stop reason: HOSPADM

## 2018-09-21 RX ADMIN — HYDRALAZINE HYDROCHLORIDE 50 MG: 50 TABLET, FILM COATED ORAL at 16:58

## 2018-09-21 RX ADMIN — ASPIRIN 81 MG 81 MG: 81 TABLET ORAL at 11:48

## 2018-09-21 NOTE — PROGRESS NOTES
Pt son and daughter in law provided discharge instructions. Pt got plavix prescription filled in the outpt pharm and has 30 day supply to take home. Verified pts allergy to atenolol via primary clinic and placed in chart. All questions answered and pt and children verbalized understanding. Pt verified understanding of d/c instructions using teachback method. PIVs removed. No hematoma or bleeding noted at this time. Procedure site within normal limits. Pt escorted to front of building for discharge.  Patient armband removed and shredded

## 2018-09-21 NOTE — IP AVS SNAPSHOT
[address] Patient: Radha Vann MRN: [id number] AIC:6[birth year]22[birth year]16 About your hospitalization You were admitted on:  September 21, 2018 You last received care in the:  [facility] CATH LAB You were discharged on:  September 21, 2018 Why you were hospitalized Your primary diagnosis was:  Not on File Your diagnoses also included:  Cad (Coronary Artery Disease) Follow-up Information Follow up With Details Comments Contact Info MD Robbie Horner MD Schedule an appointment as soon as possible for a visit in 2 week(s)  [address] [facility] [address] 
[phone] Discharge Orders None A check mirta indicates which time of day the medication should be taken. My Medications START taking these medications Instructions Each Dose to Equal  
 Morning Noon Evening Bedtime  
 aspirin 81 mg chewable tablet Your last dose was: Your next dose is: Take 1 Tab by mouth daily. 81 mg  
    
   
   
   
  
 clopidogrel 75 mg Tab Commonly known as:  PLAVIX Your last dose was: Your next dose is: Take 1 Tab by mouth daily. 75 mg  
    
   
   
   
  
 simvastatin 40 mg tablet Commonly known as:  ZOCOR Your last dose was: Your next dose is: Take 1 Tab by mouth nightly. 40 mg CHANGE how you take these medications Instructions Each Dose to Equal  
 Morning Noon Evening Bedtime  
 amLODIPine 10 mg tablet Commonly known as:  Zaire De Leon What changed:   
- medication strength 
- how much to take Your last dose was: Your next dose is: Take 1 Tab by mouth daily. 10 mg  
    
   
   
   
  
 lisinopril 10 mg tablet Commonly known as:  Michelle Stack  
 What changed:   
- medication strength 
- how much to take - when to take this Your last dose was: Your next dose is: Take 1 Tab by mouth daily. 10 mg CONTINUE taking these medications Instructions Each Dose to Equal  
 Morning Noon Evening Bedtime  
 albuterol 90 mcg/actuation inhaler Commonly known as:  PROVENTIL HFA, VENTOLIN HFA, PROAIR HFA Your last dose was: Your next dose is: Take 2 Puffs by inhalation every four (4) hours as needed for Wheezing or Shortness of Breath. 2 Puff  
    
   
   
   
  
 umeclidinium-vilanterol 62.5-25 mcg/actuation inhaler Commonly known as:  Charline Velasquez Your last dose was: Your next dose is: Take 1 Puff by inhalation daily. 1 Puff STOP taking these medications   
 metoprolol tartrate 25 mg tablet Commonly known as:  LOPRESSOR Where to Get Your Medications These medications were sent to [address] [place] [address], [facility] Phone:  [phone]  
  amLODIPine 10 mg tablet  
 clopidogrel 75 mg Tab  
 lisinopril 10 mg tablet  
 simvastatin 40 mg tablet Information on where to get these meds will be given to you by the nurse or doctor. ! Ask your nurse or doctor about these medications  
  aspirin 81 mg chewable tablet Discharge Instructions New Medications Aspirin 81 mg daily Plavix(clopidogrel) 75 mg daily. Take 2 tabs tomorrow morning when you first wake up then just one pill every day after Norvasc(amlodipine) 10 mg daily start tomorrow morning Zocor(simvastatin) 40 mg daily start tomorrow morning Prinivil(zestril, lisinopril) 10mg by mouth daily start tomorrow morning Follow up appointment with Dr Gustabo Finley in 2 weeks or if you have any questions before then the office number is [phone] Cardiac Catheterization/Angiography Discharge Instructions *Check the puncture site frequently for swelling or bleeding. If you see any bleeding, lie down and apply pressure over the area with a clean town or washcloth. Notify your doctor for any redness, swelling, drainage or oozing from the puncture site. Notify your doctor for any fever or chills. *If the leg or arm with the puncture becomes cold, numb or painful, call Dr Brad Jones [phone] *Activity should be limited for the next 72 hours. and avoid any  strenuous activity for 72 hours. No heavy lifting (anything over 10 pounds) for three days. *Do not drive for 24 hours. *You may resume your usual diet. Drink more fluids than usual. 
 
*Have a responsible person drive you home and stay with you for at least 24 hours after your heart catheterization/angiography. *You may remove the bandage from your Right and Arm in 24 hours. You may shower in 24 hours. No tub baths, hot tubs or swimming for one week. Do not place any lotions, creams, powders, ointments over the puncture site for one week. You may place a clean band-aid over the puncture site each day for 5 days. Change this daily. DISCHARGE SUMMARY from Nurse PATIENT INSTRUCTIONS: 
 
 
F-face looks uneven A-arms unable to move or move unevenly S-speech slurred or non-existent T-time-call 911 as soon as signs and symptoms begin-DO NOT go Back to bed or wait to see if you get better-TIME IS BRAIN. Warning Signs of HEART ATTACK Call 911 if you have these symptoms: 
? Chest discomfort. Most heart attacks involve discomfort in the center of the chest that lasts more than a few minutes, or that goes away and comes back. It can feel like uncomfortable pressure, squeezing, fullness, or pain. ? Discomfort in other areas of the upper body.  Symptoms can include pain or discomfort in one or both arms, the back, neck, jaw, or stomach. ? Shortness of breath with or without chest discomfort. ? Other signs may include breaking out in a cold sweat, nausea, or lightheadedness. Don't wait more than five minutes to call [address]! Fast action can save your life. Calling 911 is almost always the fastest way to get lifesaving treatment. Emergency Medical Services staff can begin treatment when they arrive  up to an hour sooner than if someone gets to the hospital by car. The discharge information has been reviewed with the patient and son. The patient and son verbalized understanding. Discharge medications reviewed with the patient and son and appropriate educational materials and side effects teaching were provided. ___________________________________________________________________________________________________________________________________ Introducing [facility]! [facility] introduces Yanado patient portal. Now you can access parts of your medical record, email your doctor's office, and request medication refills online. 1. In your internet browser, go to [email or web address] 2. Click on the First Time User? Click Here link in the Sign In box. You will see the New Member Sign Up page. 3. Enter your Yanado Access Code exactly as it appears below. You will not need to use this code after youve completed the sign-up process. If you do not sign up before the expiration date, you must request a new code. · Yanado Access Code: [email or web address] Expires: 10/9/2018  2:11 PM 
 
4. Enter the last four digits of your Social Security Number (xxxx) and Date of Birth (mm/dd/yyyy) as indicated and click Submit. You will be taken to the next sign-up page. 5. Create a MyChart ID. This will be your [employer] login ID and cannot be changed, so think of one that is secure and easy to remember. 6. Create a Assembla password. You can change your password at any time. 7. Enter your Password Reset Question and Answer. This can be used at a later time if you forget your password. 8. Enter your e-mail address. You will receive e-mail notification when new information is available in [address] 9. Click Sign Up. You can now view and download portions of your medical record. 10. Click the Download Summary menu link to download a portable copy of your medical information. If you have questions, please visit the Frequently Asked Questions section of the Assembla website. Remember, Assembla is NOT to be used for urgent needs. For medical emergencies, dial 911. Now available from your iPhone and Android! Introducing Bryan Menjivar As a [employer] patient, I wanted to make you aware of our electronic visit tool called Bryan Menjivar. [employer] 24/7 allows you to connect within minutes with a medical provider 24 hours a day, seven days a week via a mobile device or tablet or logging into a secure website from your computer. You can access Bryan Menjivar from anywhere in the [place]. A virtual visit might be right for you when you have a simple condition and feel like you just dont want to get out of bed, or cant get away from work for an appointment, when your regular [employer] provider is not available (evenings, weekends or holidays), or when youre out of town and need minor care. Electronic visits cost only $49 and if the [employer] 24/7 provider determines a prescription is needed to treat your condition, one can be electronically transmitted to a nearby pharmacy*. Please take a moment to enroll today if you have not already done so. The enrollment process is free and takes just a few minutes. To enroll, please download the [employer] 24/7 caleb to your tablet or phone, or visit [email or web address] to enroll on your computer. And, as an [address] [place] [address] patient with a [employer] account, the results of your visits will be scanned into your electronic medical record and your primary care provider will be able to view the scanned results. We urge you to continue to see your regular [employer] provider for your ongoing medical care. And while your primary care provider may not be the one available when you seek a Bryan Norwoodfin virtual visit, the peace of mind you get from getting a real diagnosis real time can be priceless. For more information on [employer], view our Frequently Asked Questions (FAQs) at [email or web address]. Sincerely, 
 
Rachael Mcneil MD 
Chief Medical Officer Reny Lawrence *:  certain medications cannot be prescribed via e994 Providers Seen During Your Hospitalization Provider Specialty Primary office phone Graciela Krause MD Cardiology [phone] Your Primary Care Physician (PCP) Primary Care Physician Office Phone Office Fax PEPITO LOMBARDO ** None ** ** None ** You are allergic to the following Allergen Reactions Atenolol Other (comments) Pt c/o chest pain and is reluctant to take med due to being told that it could cause \"death\" Nalbuphine Other (comments) Extremely nauseous Seasonale (Levonorgestrel-Ethinyl Estrad) Other (comments) Not allergic to this medication per patient Recent Documentation Height Weight BMI Smoking Status 1.702 m 81.6 kg 28.19 kg/m2 Current Every Day Smoker Emergency Contacts Name Discharge Info Relation Home Work Mobile [address] CAREGIVER [3] Son [22] [phone] Janeth Massey  Child [2] [phone] Patient Belongings The following personal items are in your possession at time of discharge: 
  Dental Appliances: None  Visual Aid: None Please provide this summary of care documentation to your next provider. Signatures-by signing, you are acknowledging that this After Visit Summary has been reviewed with you and you have received a copy. Patient Signature:  ____________________________________________________________ Date:  ____________________________________________________________  
  
[facility] Signature:  ____________________________________________________________ Date:  ____________________________________________________________

## 2018-09-21 NOTE — DISCHARGE INSTRUCTIONS
New Medications  Aspirin 81 mg daily  Plavix(clopidogrel) 75 mg daily. Take 2 tabs tomorrow morning when you first wake up then just one pill every day after   Norvasc(amlodipine) 10 mg daily start tomorrow morning  Zocor(simvastatin) 40 mg daily start tomorrow morning  Prinivil(zestril, lisinopril) 10mg by mouth daily start tomorrow morning    Follow up appointment with Dr Skip Beavers in 2 weeks or if you have any questions before then the office number is [phone]      Cardiac Catheterization/Angiography Discharge Instructions    *Check the puncture site frequently for swelling or bleeding. If you see any bleeding, lie down and apply pressure over the area with a clean town or washcloth. Notify your doctor for any redness, swelling, drainage or oozing from the puncture site. Notify your doctor for any fever or chills. *If the leg or arm with the puncture becomes cold, numb or painful, call Dr Skip Beavers [phone]    *Activity should be limited for the next 72 hours. and avoid any  strenuous activity for 72 hours. No heavy lifting (anything over 10 pounds) for three days. *Do not drive for 24 hours. *You may resume your usual diet. Drink more fluids than usual.    *Have a responsible person drive you home and stay with you for at least 24 hours after your heart catheterization/angiography. *You may remove the bandage from your Right and Arm in 24 hours. You may shower in 24 hours. No tub baths, hot tubs or swimming for one week. Do not place any lotions, creams, powders, ointments over the puncture site for one week. You may place a clean band-aid over the puncture site each day for 5 days. Change this daily.     DISCHARGE SUMMARY from Nurse    PATIENT INSTRUCTIONS:    After general anesthesia or intravenous sedation, for 24 hours or while taking prescription Narcotics:  · Limit your activities  · Do not drive and operate hazardous machinery  · Do not make important personal or business decisions  · Do  not drink alcoholic beverages  · If you have not urinated within 8 hours after discharge, please contact your surgeon on call. Report the following to your surgeon:  · Excessive pain, swelling, redness or odor of or around the surgical area  · Temperature over 100.5  · Nausea and vomiting lasting longer than 4 hours or if unable to take medications  · Any signs of decreased circulation or nerve impairment to extremity: change in color, persistent  numbness, tingling, coldness or increase pain  · Any questions    *  Please give a list of your current medications to your Primary Care Provider. *  Please update this list whenever your medications are discontinued, doses are      changed, or new medications (including over-the-counter products) are added. *  Please carry medication information at all times in case of emergency situations. These are general instructions for a healthy lifestyle:    No smoking/ No tobacco products/ Avoid exposure to second hand smoke  Surgeon General's Warning:  Quitting smoking now greatly reduces serious risk to your health. Obesity, smoking, and sedentary lifestyle greatly increases your risk for illness    A healthy diet, regular physical exercise & weight monitoring are important for maintaining a healthy lifestyle    You may be retaining fluid if you have a history of heart failure or if you experience any of the following symptoms:  Weight gain of 3 pounds or more overnight or 5 pounds in a week, increased swelling in our hands or feet or shortness of breath while lying flat in bed. Please call your doctor as soon as you notice any of these symptoms; do not wait until your next office visit.     Recognize signs and symptoms of STROKE:    F-face looks uneven    A-arms unable to move or move unevenly    S-speech slurred or non-existent    T-time-call 911 as soon as signs and symptoms begin-DO NOT go       Back to bed or wait to see if you get better-TIME IS BRAIN. Warning Signs of HEART ATTACK     Call 911 if you have these symptoms:   Chest discomfort. Most heart attacks involve discomfort in the center of the chest that lasts more than a few minutes, or that goes away and comes back. It can feel like uncomfortable pressure, squeezing, fullness, or pain.  Discomfort in other areas of the upper body. Symptoms can include pain or discomfort in one or both arms, the back, neck, jaw, or stomach.  Shortness of breath with or without chest discomfort.  Other signs may include breaking out in a cold sweat, nausea, or lightheadedness. Don't wait more than five minutes to call 911 - MINUTES MATTER! Fast action can save your life. Calling 911 is almost always the fastest way to get lifesaving treatment. Emergency Medical Services staff can begin treatment when they arrive -- up to an hour sooner than if someone gets to the hospital by car. The discharge information has been reviewed with the patient and son. The patient and son verbalized understanding. Discharge medications reviewed with the patient and son and appropriate educational materials and side effects teaching were provided.   ___________________________________________________________________________________________________________________________________

## 2018-09-21 NOTE — Clinical Note
Contrast Dose Calculator:  
Patient's age: 63.  
Patient's sex: Male. Patient weight (kg) = 82. Creatinine level (mg/dL) = 0.88. Creatinine clearance (mL/min): 107.42. Contrast concentration (mg/mL) = 300. MACD = 300 mL. Max Contrast dose per Creatinine Cl calculator = 241.69 mL.

## 2018-09-21 NOTE — Clinical Note
Right groin and right radial and draped. Wet prep solution applied at: 1248. Wet prep solution dried at: 1251. Wet prep elapsed drying time: 3 mins.

## 2018-09-21 NOTE — ROUTINE PROCESS
Pt brought to [place] [address] ambulatory. Pt placed in bay 2 and connected to monitor. Baseline VS taken and PIV started x 2. Admission database completed. Pt ready for ordered procedure.

## 2018-09-21 NOTE — Clinical Note
TRANSFER - OUT REPORT:  
 
Verbal report given to: Jaimee Buck RN. Report consisted of patient's Situation, Background, Assessment and  
Recommendations(SBAR). Opportunity for questions and clarification was provided. Patient transported with a Cardiac Cath Tech / Patient Care Tech. Patient transported to: [address]

## 2018-09-21 NOTE — Clinical Note
TRANSFER - IN REPORT:  
 
Verbal report received from: Stacey Boston RN. Report consisted of patient's Situation, Background, Assessment and  
Recommendations(SBAR). Opportunity for questions and clarification was provided. Assessment completed upon patient's arrival to unit and care assumed. Patient transported with a Cardiac Cath Tech / Patient Care Tech.

## 2018-09-24 ENCOUNTER — TELEPHONE (OUTPATIENT)
Dept: CARDIAC REHAB | Age: 58
End: 2018-09-24

## 2018-09-24 LAB — ACT BLD: 291 SECS (ref 79–138)

## 2018-09-24 NOTE — TELEPHONE ENCOUNTER
Cardiac rehab attempted to call patient- no answer.  Mailed information to patient about relaxation, exercise, nutrition, smoking cessation, the importance of taking medications that are prescribed and the outpatient cardiac rehab program. Will forward patient information to the  for outpatient cardiac rehab so that the patient can get started in the program.

## 2018-10-03 ENCOUNTER — TELEPHONE (OUTPATIENT)
Dept: CARDIAC REHAB | Age: 58
End: 2018-10-03

## 2018-10-03 NOTE — TELEPHONE ENCOUNTER
Cardiac Rehab called patient but was unable to reach or leave a message at the number provided. Additional attempts at contact will be made.     Thank you,  Beni Pastor

## 2018-10-09 ENCOUNTER — TELEPHONE (OUTPATIENT)
Dept: CARDIAC REHAB | Age: 58
End: 2018-10-09

## 2018-10-09 NOTE — TELEPHONE ENCOUNTER
Cardiac Rehab called patient and spoke to him about the program. He declined services because he does not have insurance and is not ready at this time.      Thank you,  Zaria Alvarez

## 2018-10-10 ENCOUNTER — OFFICE VISIT (OUTPATIENT)
Dept: CARDIOLOGY CLINIC | Age: 58
End: 2018-10-10

## 2018-10-10 VITALS
HEART RATE: 91 BPM | BODY MASS INDEX: 29.35 KG/M2 | SYSTOLIC BLOOD PRESSURE: 101 MMHG | HEIGHT: 67 IN | DIASTOLIC BLOOD PRESSURE: 66 MMHG | WEIGHT: 187 LBS

## 2018-10-10 DIAGNOSIS — E78.5 DYSLIPIDEMIA: ICD-10-CM

## 2018-10-10 DIAGNOSIS — J43.9 PULMONARY EMPHYSEMA, UNSPECIFIED EMPHYSEMA TYPE (HCC): ICD-10-CM

## 2018-10-10 DIAGNOSIS — I25.118 CORONARY ARTERY DISEASE OF NATIVE ARTERY OF NATIVE HEART WITH STABLE ANGINA PECTORIS (HCC): Primary | ICD-10-CM

## 2018-10-10 DIAGNOSIS — I10 ESSENTIAL HYPERTENSION: ICD-10-CM

## 2018-10-10 DIAGNOSIS — Z82.49 FAMILY HISTORY OF PREMATURE CAD: ICD-10-CM

## 2018-10-10 DIAGNOSIS — F17.200 SMOKER: ICD-10-CM

## 2018-10-10 NOTE — PROGRESS NOTES
HISTORY OF PRESENT ILLNESS  Delia Bowen is a 63 y.o. male. Shortness of Breath   The history is provided by the patient. This is a chronic problem. The problem occurs intermittently. The problem has not changed since onset. Associated symptoms include PND and chest pain. Pertinent negatives include no fever, no ear pain, no neck pain, no cough, no sputum production, no hemoptysis, no wheezing, no orthopnea, no vomiting, no rash, no leg swelling and no claudication. Associated medical issues do not include CAD or heart failure. Chest Pain (Angina)    The history is provided by the patient. This is a chronic problem. The problem has been gradually improving. The problem occurs rarely. The pain is associated with exertion. The pain is present in the substernal region. The pain is at a severity of 4/10. The pain is moderate. The quality of the pain is described as tightness. The pain radiates to the left neck and left jaw. Associated symptoms include malaise/fatigue, PND and shortness of breath. Pertinent negatives include no claudication, no cough, no diaphoresis, no dizziness, no fever, no hemoptysis, no nausea, no orthopnea, no palpitations, no sputum production, no vomiting and no weakness. Risk factors include family history, smoking/tobacco exposure, hypertension and male gender. His past medical history is significant for HTN. Procedural history includes cardiac catheterization. Pertinent negatives include no echocardiogram and no stress thallium. Review of Systems   Constitutional: Positive for malaise/fatigue. Negative for chills, diaphoresis, fever and weight loss. HENT: Negative for congestion, ear discharge, ear pain, hearing loss, nosebleeds and tinnitus. Eyes: Negative for blurred vision. Respiratory: Positive for shortness of breath. Negative for cough, hemoptysis, sputum production, wheezing and stridor. Cardiovascular: Positive for chest pain and PND.  Negative for palpitations, orthopnea, claudication and leg swelling. Gastrointestinal: Negative for heartburn, nausea and vomiting. Musculoskeletal: Negative for myalgias and neck pain. Skin: Negative for itching and rash. Neurological: Negative for dizziness, tingling, tremors, focal weakness, loss of consciousness and weakness. Psychiatric/Behavioral: Negative for depression and suicidal ideas. Family History   Problem Relation Age of Onset    No Known Problems Mother     Heart Attack Father     Stroke Brother        Past Medical History:   Diagnosis Date    Chronic lung disease        Past Surgical History:   Procedure Laterality Date    HX HERNIA REPAIR         Social History   Substance Use Topics    Smoking status: Current Every Day Smoker     Packs/day: 1.00     Years: 35.00     Start date: 7/11/1973    Smokeless tobacco: Never Used    Alcohol use Yes       Allergies   Allergen Reactions    Atenolol Other (comments)     Pt c/o chest pain and is reluctant to take med due to being told that it could cause \"death\"    Nalbuphine Other (comments)     Extremely nauseous    Seasonale [Levonorgestrel-Ethinyl Estrad] Other (comments)     Not allergic to this medication per patient       Outpatient Prescriptions Marked as Taking for the 10/10/18 encounter (Office Visit) with Natalia Lopez MD   Medication Sig Dispense Refill    aspirin 81 mg chewable tablet Take 1 Tab by mouth daily. 90 Tab 3    clopidogrel (PLAVIX) 75 mg tab Take 1 Tab by mouth daily. 30 Tab 6    amLODIPine (NORVASC) 10 mg tablet Take 1 Tab by mouth daily. 30 Tab 4    simvastatin (ZOCOR) 40 mg tablet Take 1 Tab by mouth nightly. 30 Tab 4    lisinopril (PRINIVIL, ZESTRIL) 10 mg tablet Take 1 Tab by mouth daily. 30 Tab 4        Visit Vitals    /66    Pulse 91    Ht 5' 7\" (1.702 m)    Wt 84.8 kg (187 lb)    BMI 29.29 kg/m2     Physical Exam   Constitutional: He is oriented to person, place, and time.  He appears well-developed and well-nourished. No distress. HENT:   Head: Atraumatic. Mouth/Throat: No oropharyngeal exudate. Eyes: Conjunctivae are normal. Right eye exhibits no discharge. Left eye exhibits no discharge. No scleral icterus. Neck: Normal range of motion. Neck supple. No JVD present. No tracheal deviation present. No thyromegaly present. Cardiovascular: Normal rate and regular rhythm. Exam reveals no gallop. No murmur heard. Pulmonary/Chest: Effort normal and breath sounds normal. No stridor. He has no wheezes. He has no rales. Abdominal: Soft. There is no tenderness. There is no rebound and no guarding. Musculoskeletal: Normal range of motion. He exhibits no edema or tenderness. Lymphadenopathy:     He has no cervical adenopathy. Neurological: He is alert and oriented to person, place, and time. He exhibits normal muscle tone. Skin: Skin is warm. He is not diaphoretic. Psychiatric: He has a normal mood and affect. His behavior is normal.     ekg sinus rhythm with no acute st-t changes  Echo and lexiscan from 2012 reviewed  ASSESSMENT and PLAN    ICD-10-CM ICD-9-CM    1. Coronary artery disease of native artery of native heart with stable angina pectoris ([address] I25.118 414.01      413.9    2. Pulmonary emphysema, unspecified emphysema type ([address] J43.9 492.8    3. Essential hypertension I10 401.9    4. Smoker F17.200 305.1    5. Family history of premature CAD Z82.49 V17.3    6. Dyslipidemia E78.5 272.4 LIPID PANEL      HEPATIC FUNCTION PANEL     Orders Placed This Encounter    LIPID PANEL     Standing Status:   Future     Standing Expiration Date:   10/11/2019    HEPATIC FUNCTION PANEL     Standing Status:   Future     Standing Expiration Date:   10/11/2019     Follow-up Disposition:  Return in about 6 months (around 4/10/2019).   very strongly urged to quit smoking to reduce cardiovascular risk  cardiovascular risk and specific lipid/LDL goals reviewed  use of aspirin to prevent MI and TIA's discussed. Patient with long standing h/o smoking and family h/o premature CAD seen for chest pain- tightness radiating to left jaw associated with mild sob. Patient underwent cardiac cath subsequent PCI to obtuse marginal 1 artery. Seen for follow-up today, reports improvement in symptoms. He is on dual antiplatelet therapy at this time. We will obtain lipid and liver panel as he is on statin. He is working on cutting down smoking. Follow-up in 6 months.

## 2018-10-10 NOTE — MR AVS SNAPSHOT
[facility] 
 
 
 [facility] [address] [facility] 
[phone] Patient: Shanae Finnegan MRN: [id number] QNM:[id number] Visit Information Date & Time Provider Department Dept. Phone Encounter #  
 10/10/2018  2:00 PM Lillian Grijalva MD [facility] [phone] [id number] Follow-up Instructions Return in about 6 months (around 4/10/2019). Your Appointments 4/10/2019  2:00 PM  
Office Visit with Lillian Grijalva MD  
[facility] ([address] [facility]) Appt Note: 6 month follow up/labs [address] [facility] [address]  
  
   
 [place] [address]. [address] [place] [address] Upcoming Health Maintenance Date Due Hepatitis C Screening 1960 Pneumococcal 19-64 Medium Risk (1 of 1 - PPSV23) 8/24/1980 DTaP/Tdap/Td series (1 - Tdap) 8/24/1982 Shingrix Vaccine Age 50> (1 of 2) 8/24/2011 FOBT Q 1 YEAR AGE 50-75 8/24/2011 Influenza Age 5 to Adult 8/1/2018 Allergies as of 10/10/2018  Review Complete On: 10/10/2018 By: Dmitri Kemp Severity Noted Reaction Type Reactions Atenolol  09/21/2018    Other (comments) Pt c/o chest pain and is reluctant to take med due to being told that it could cause \"death\" Nalbuphine  04/07/2011    Other (comments) Extremely nauseous Seasonale [Levonorgestrel-ethinyl Estrad]  04/07/2011    Other (comments) Not allergic to this medication per patient Current Immunizations  Never Reviewed No immunizations on file. Not reviewed this visit You Were Diagnosed With   
  
 Codes Comments Coronary artery disease of native artery of native heart with stable angina pectoris ([facility] [address]    -  Primary ICD-10-CM: I25.118 
ICD-9-CM: 414.01, 413.9 Pulmonary emphysema, unspecified emphysema type ([facility] [address]     ICD-10-CM: J43.9 ICD-9-CM: 492.8  Essential hypertension     ICD-10-CM: I10 
 ICD-9-CM: 401.9 Smoker     ICD-10-CM: Z56.454 ICD-9-CM: 305.1 Family history of premature CAD     ICD-10-CM: Z82.49 
ICD-9-CM: V17.3 Dyslipidemia     ICD-10-CM: E78.5 ICD-9-CM: 272.4 Vitals BP Pulse Height(growth percentile) Weight(growth percentile) BMI Smoking Status 101/66 91 5' 7\" (1.702 m) 187 lb (84.8 kg) 29.29 kg/m2 Current Every Day Smoker Vitals History BMI and BSA Data Body Mass Index Body Surface Area  
 29.29 kg/m 2 2 m 2 Preferred Pharmacy Pharmacy Name Phone [address] [facility], [address] [facility] Your Updated Medication List  
  
   
This list is accurate as of 10/10/18  2:43 PM.  Always use your most recent med list.  
  
  
  
  
 albuterol 90 mcg/actuation inhaler Commonly known as:  PROVENTIL HFA, VENTOLIN HFA, PROAIR HFA Take 2 Puffs by inhalation every four (4) hours as needed for Wheezing or Shortness of Breath. amLODIPine 10 mg tablet Commonly known as:  [place] Take 1 Tab by mouth daily. aspirin 81 mg chewable tablet Take 1 Tab by mouth daily. clopidogrel 75 mg Tab Commonly known as:  PLAVIX Take 1 Tab by mouth daily. lisinopril 10 mg tablet Commonly known as:  [place] Take 1 Tab by mouth daily. simvastatin 40 mg tablet Commonly known as:  ZOCOR Take 1 Tab by mouth nightly. umeclidinium-vilanterol 62.5-25 mcg/actuation inhaler Commonly known as:  [place] Take 1 Puff by inhalation daily. Follow-up Instructions Return in about 6 months (around 4/10/2019). To-Do List   
 12/10/2018 Lab:  LIPID PANEL   
  
 12/26/2018 Lab:  HEPATIC FUNCTION PANEL Patient Instructions Learning About Coronary Artery Disease (CAD) What is coronary artery disease?  
 
Coronary artery disease (CAD) occurs when plaque builds up in the arteries that bring oxygen-rich blood to your heart. Plaque is a fatty substance made of cholesterol, calcium, and other substances in the blood. This process is called hardening of the arteries, or atherosclerosis. What happens when you have coronary artery disease? · Plaque may narrow the coronary arteries. Narrowed arteries cause poor blood flow. This can lead to angina symptoms such as chest pain or discomfort. If blood flow is completely blocked, you could have a heart attack. · You can slow CAD and reduce the risk of future problems by making changes in your lifestyle. These include quitting smoking and eating heart-healthy foods. · Treatments for CAD, along with changes in your lifestyle, can help you live a longer and healthier life. How can you prevent coronary artery disease? · Do not smoke. It may be the best thing you can do to prevent heart disease. If you need help quitting, talk to your doctor about stop-smoking programs and medicines. These can increase your chances of quitting for good. · Be active. Get at least 30 minutes of exercise on most days of the week. Walking is a good choice. You also may want to do other activities, such as running, swimming, cycling, or playing tennis or team sports. · Eat heart-healthy foods. Eat more fruits and vegetables and less foods that contain saturated and trans fats. Limit alcohol, sodium, and sweets. · Stay at a healthy weight. Lose weight if you need to. · Manage other health problems such as diabetes, high blood pressure, and high cholesterol. · Manage stress. Stress can hurt your heart. To keep stress low, talk about your problems and feelings. Don't keep your feelings hidden. · If you have talked about it with your doctor, take a low-dose aspirin every day. Aspirin can help certain people lower their risk of a heart attack or stroke.  But taking aspirin isn't right for everyone, because it can cause serious bleeding. Do not start taking daily aspirin unless your doctor knows about it. How is coronary artery disease treated? · Your doctor will suggest that you make lifestyle changes. For example, your doctor may ask you to eat healthy foods, quit smoking, lose extra weight, and be more active. · You will have to take medicines. · Your doctor may suggest a procedure to open narrowed or blocked arteries. This is called angioplasty. Or your doctor may suggest using healthy blood vessels to create detours around narrowed or blocked arteries. This is called bypass surgery. Follow-up care is a key part of your treatment and safety. Be sure to make and go to all appointments, and call your doctor if you are having problems. It's also a good idea to know your test results and keep a list of the medicines you take. Where can you learn more? Go to http://halina-mikie.info/. Enter ([phone] 5411 4375 in the search box to learn more about \"Learning About Coronary Artery Disease (CAD). \" Current as of: December 6, 2017 Content Version: 11.8 © 9444-1419 [employer]. Care instructions adapted under license by [employer] (which disclaims liability or warranty for this information). If you have questions about a medical condition or this instruction, always ask your healthcare professional. [address] any warranty or liability for your use of this information. Introducing [facility]! [address] [facility] introduces Interactive Fate patient portal. Now you can access parts of your medical record, email your doctor's office, and request medication refills online. 1. In your internet browser, go to [email or web address] 2. Click on the First Time User? Click Here link in the Sign In box. You will see the New Member Sign Up page. 3. Enter your Interactive Fate Access Code exactly as it appears below.  You will not need to use this code after youve completed the sign-up process. If you do not sign up before the expiration date, you must request a new code. · Ontuitive Access Code: [email or web address] Expires: 1/8/2019  2:07 PM 
 
4. Enter the last four digits of your Social Security Number (xxxx) and Date of Birth (mm/dd/yyyy) as indicated and click Submit. You will be taken to the next sign-up page. 5. Create a Ontuitive ID. This will be your Ontuitive login ID and cannot be changed, so think of one that is secure and easy to remember. 6. Create a Ontuitive password. You can change your password at any time. 7. Enter your Password Reset Question and Answer. This can be used at a later time if you forget your password. 8. Enter your e-mail address. You will receive e-mail notification when new information is available in [address] 9. Click Sign Up. You can now view and download portions of your medical record. 10. Click the Download Summary menu link to download a portable copy of your medical information. If you have questions, please visit the Frequently Asked Questions section of the Ontuitive website. Remember, Ontuitive is NOT to be used for urgent needs. For medical emergencies, dial 911. Now available from your iPhone and Android! Please provide this summary of care documentation to your next provider. Your primary care clinician is listed as Sarah Taveras. If you have any questions after today's visit, please call [phone].

## 2018-10-10 NOTE — PROGRESS NOTES
1. Have you been to the ER, urgent care clinic since your last visit? Hospitalized since your last visit? No    2. Have you seen or consulted any other health care providers outside of the [address] [place] [address] since your last visit? Include any pap smears or colon screening. No     3. Since your last visit, have you had any of the following symptoms? .           4. Have you had any blood work, X-rays or cardiac testing? No        5. Where do you normally have your labs drawn? [facility]    6. Do you need any refills today?    No

## 2018-10-10 NOTE — PATIENT INSTRUCTIONS
Learning About Coronary Artery Disease (CAD)  What is coronary artery disease? Coronary artery disease (CAD) occurs when plaque builds up in the arteries that bring oxygen-rich blood to your heart. Plaque is a fatty substance made of cholesterol, calcium, and other substances in the blood. This process is called hardening of the arteries, or atherosclerosis. What happens when you have coronary artery disease? · Plaque may narrow the coronary arteries. Narrowed arteries cause poor blood flow. This can lead to angina symptoms such as chest pain or discomfort. If blood flow is completely blocked, you could have a heart attack. · You can slow CAD and reduce the risk of future problems by making changes in your lifestyle. These include quitting smoking and eating heart-healthy foods. · Treatments for CAD, along with changes in your lifestyle, can help you live a longer and healthier life. How can you prevent coronary artery disease? · Do not smoke. It may be the best thing you can do to prevent heart disease. If you need help quitting, talk to your doctor about stop-smoking programs and medicines. These can increase your chances of quitting for good. · Be active. Get at least 30 minutes of exercise on most days of the week. Walking is a good choice. You also may want to do other activities, such as running, swimming, cycling, or playing tennis or team sports. · Eat heart-healthy foods. Eat more fruits and vegetables and less foods that contain saturated and trans fats. Limit alcohol, sodium, and sweets. · Stay at a healthy weight. Lose weight if you need to. · Manage other health problems such as diabetes, high blood pressure, and high cholesterol. · Manage stress. Stress can hurt your heart. To keep stress low, talk about your problems and feelings. Don't keep your feelings hidden. · If you have talked about it with your doctor, take a low-dose aspirin every day.  Aspirin can help certain people lower their risk of a heart attack or stroke. But taking aspirin isn't right for everyone, because it can cause serious bleeding. Do not start taking daily aspirin unless your doctor knows about it. How is coronary artery disease treated? · Your doctor will suggest that you make lifestyle changes. For example, your doctor may ask you to eat healthy foods, quit smoking, lose extra weight, and be more active. · You will have to take medicines. · Your doctor may suggest a procedure to open narrowed or blocked arteries. This is called angioplasty. Or your doctor may suggest using healthy blood vessels to create detours around narrowed or blocked arteries. This is called bypass surgery. Follow-up care is a key part of your treatment and safety. Be sure to make and go to all appointments, and call your doctor if you are having problems. It's also a good idea to know your test results and keep a list of the medicines you take. Where can you learn more? Go to http://halina-mikie.info/. Enter ([phone] 2337 7495 in the search box to learn more about \"Learning About Coronary Artery Disease (CAD). \"  Current as of: December 6, 2017  Content Version: 11.8  © 1907-5835 [employer]wise[employer]orporated. Care instructions adapted under license by [employer] (which disclaims liability or warranty for this information). If you have questions about a medical condition or this instruction, always ask your healthcare professional. [place] [address] any warranty or liability for your use of this information.

## 2018-12-13 ENCOUNTER — TELEPHONE (OUTPATIENT)
Dept: PULMONOLOGY | Age: 58
End: 2018-12-13

## 2018-12-13 NOTE — TELEPHONE ENCOUNTER
Pt is supposed to have a CT in January, but he had a stent placed in Aug or Sept and doesn't know if he should still have CT done. Please let me know and I will call him back and let him know.

## 2019-01-04 ENCOUNTER — HOSPITAL ENCOUNTER (OUTPATIENT)
Dept: CT IMAGING | Age: 59
Discharge: HOME | End: 2019-01-04
Attending: INTERNAL MEDICINE
Payer: SELF-PAY

## 2019-01-04 DIAGNOSIS — J43.2 CENTRILOBULAR EMPHYSEMA (HCC): ICD-10-CM

## 2019-01-04 PROCEDURE — 71250 CT THORAX DX C-: CPT

## 2019-01-15 ENCOUNTER — OFFICE VISIT (OUTPATIENT)
Dept: PULMONOLOGY | Age: 59
End: 2019-01-15

## 2019-01-15 VITALS
SYSTOLIC BLOOD PRESSURE: 108 MMHG | HEART RATE: 110 BPM | RESPIRATION RATE: 18 BRPM | DIASTOLIC BLOOD PRESSURE: 62 MMHG | WEIGHT: 171 LBS | TEMPERATURE: 97.9 F | OXYGEN SATURATION: 95 % | HEIGHT: 67 IN | BODY MASS INDEX: 26.84 KG/M2

## 2019-01-15 DIAGNOSIS — R91.8 PULMONARY NODULES: ICD-10-CM

## 2019-01-15 DIAGNOSIS — F17.210 NICOTINE DEPENDENCE, CIGARETTES, UNCOMPLICATED: ICD-10-CM

## 2019-01-15 DIAGNOSIS — I21.9 MYOCARDIAL INFARCTION, UNSPECIFIED MI TYPE, UNSPECIFIED ARTERY (HCC): ICD-10-CM

## 2019-01-15 DIAGNOSIS — J44.9 COPD, GROUP B, BY GOLD 2017 CLASSIFICATION (HCC): Primary | ICD-10-CM

## 2019-01-15 DIAGNOSIS — I25.119 CORONARY ARTERY DISEASE INVOLVING NATIVE HEART WITH ANGINA PECTORIS, UNSPECIFIED VESSEL OR LESION TYPE (HCC): ICD-10-CM

## 2019-01-15 RX ORDER — VARENICLINE TARTRATE 1 MG/1
1 TABLET, FILM COATED ORAL 2 TIMES DAILY
Qty: 154 TAB | Refills: 0 | Status: SHIPPED | OUTPATIENT
Start: 2019-01-15 | End: 2019-04-02

## 2019-01-15 RX ORDER — MECLIZINE HYDROCHLORIDE 25 MG/1
25 TABLET ORAL
COMMUNITY
End: 2021-07-21 | Stop reason: ALTCHOICE

## 2019-01-15 RX ORDER — VARENICLINE TARTRATE 0.5 MG/1
TABLET, FILM COATED ORAL
Qty: 11 TAB | Refills: 0 | Status: SHIPPED | OUTPATIENT
Start: 2019-01-15 | End: 2020-06-05 | Stop reason: SDUPTHER

## 2019-01-15 RX ORDER — ALBUTEROL SULFATE 90 UG/1
1-2 AEROSOL, METERED RESPIRATORY (INHALATION)
Qty: 3 INHALER | Refills: 4 | Status: SHIPPED | OUTPATIENT
Start: 2019-01-15 | End: 2020-06-05 | Stop reason: SDUPTHER

## 2019-01-15 NOTE — PROGRESS NOTES
[address] [facility]  Pulmonary, Critical Care, and Sleep Medicine    Pulmonary Office Followup  Name: Michael Carr 63 y.o. male  MRN: [id number]  : 1960  Service Date: 01/15/19  Chief Complaint:   Chief Complaint   Patient presents with    COPD     follow up from 2018; CT 2019    Lung Nodule    Nicotine Dependence     History of Present Illness:  Michael Carr is a 63 y.o. male, who presents to Pulmonary clinic for followup of COPD. Pt last seen on 18. Pt reports that in the interval, he saw Cardiology and underwent cardiac cath and had a stent (placed in the obtuse marginal 1 artery). Patient also reports that he establish with primary care, he uses the [facility] in [place]. Pt reports he got Anoro filled by his PCP - [facility] in [place]. Pt reports he does not use it frequently. Pt reports that he thought it was associated with dizziness. He is still having it worked up and is on medication for it. He reports some improvement to his dyspnea, but it still persists. Able to do ADLs -- mMRC of 2 now instead of 3. Pt reports chronic cough, productive of clear sputum, cough worse when laying flat. He believes some of his dyspnea was due to his cardiac disease. Pt reports he continues to smoke, reduced to about 0.8PPD. No hemoptysis  Denies chest pain, night sweats, LE swelling, orthopnea, abdominal pain.       Allergies: I have reviewed the allergy hx  Allergies   Allergen Reactions    Atenolol Other (comments)     Pt c/o chest pain and is reluctant to take med due to being told that it could cause \"death\"    Nalbuphine Other (comments)     Extremely nauseous    Seasonale [Levonorgestrel-Ethinyl Estrad] Other (comments)     Not allergic to this medication per patient       Medications:  I have reviewed the patient's medications    Current Outpatient Medications:    meclizine (ANTIVERT) 25 mg tablet, Take 25 mg by mouth three (3) times daily as needed. , Disp: , Rfl:     aspirin 81 mg chewable tablet, Take 1 Tab by mouth daily. , Disp: 90 Tab, Rfl: 3    clopidogrel (PLAVIX) 75 mg tab, Take 1 Tab by mouth daily. , Disp: 30 Tab, Rfl: 6    simvastatin (ZOCOR) 40 mg tablet, Take 1 Tab by mouth nightly., Disp: 30 Tab, Rfl: 4    lisinopril (PRINIVIL, ZESTRIL) 10 mg tablet, Take 1 Tab by mouth daily. , Disp: 30 Tab, Rfl: 4    umeclidinium-vilanterol (ANORO ELLIPTA) 62.5-25 mcg/actuation inhaler, Take 1 Puff by inhalation daily. , Disp: 1 Inhaler, Rfl: 11    amLODIPine (NORVASC) 10 mg tablet, Take 1 Tab by mouth daily. , Disp: 30 Tab, Rfl: 4    albuterol (PROVENTIL HFA, VENTOLIN HFA, PROAIR HFA) 90 mcg/actuation inhaler, Take 2 Puffs by inhalation every four (4) hours as needed for Wheezing or Shortness of Breath., Disp: , Rfl:   Past Medical History:   Diagnosis Date    Chronic lung disease      Past Surgical History:   Procedure Laterality Date    HX HEART CATHETERIZATION  09/2018    HX HERNIA REPAIR       Family History   Problem Relation Age of Onset    No Known Problems Mother     Heart Attack Father     Stroke Brother      Social History     Tobacco Use    Smoking status: Current Every Day Smoker     Packs/day: 1.00     Years: 46.00     Pack years: 46.00     Start date: 7/11/1973    Smokeless tobacco: Never Used   Substance Use Topics    Alcohol use: Yes    Drug use: No     Immunizations:  I have reviewed the patient's immunizations    There is no immunization history on file for this patient. Review of Systems:  A complete review of systems was performed as stated in the HPI, all others are negative.       Objective:    Physical Exam:  /62 (BP 1 Location: Left arm, BP Patient Position: Sitting)   Pulse (!) 110   Temp 97.9 °F (36.6 °C) (Oral)   Resp 18   Ht 5' 7\" (1.702 m)   Wt 77.6 kg (171 lb)   SpO2 95%   BMI 26.78 kg/m²   Vitals were personally reviewed  Gen: no acute distress, pleasant and cooperative, sitting up in chair, smells of tobacco, ambulates without difficulty  HEENT: normocephalic/atraumatic, PERRLA, EOMI, no scleral icterus, no oral lesions, poor dentition, Mallampati II  Neck: supple, trachea midline, no JVD, no cervical and supraclavicular adenopathy  CVS: regular rate rhythm, S1/S2, no murmurs/rubs/gallops  Lungs: distant breath sounds B/L, CTABL, no wheezes/rales/rhonchi  Abdomen: soft, nontender, bowel sounds present, no hepatosplenomegaly  Ext: no pitting edema B/L, no peripheral cyanosis or clubbing  Neuro: grossly normal, AAOx3, normal strength and coordination grossly, no focal deficits  Skin: no rashes, erythema, lesions  Psych: normal memory, thought content, and processing    Labs: I have reviewed the patient's available labs, no new labs    Imaging:  I have personally reviewed patient's imaging as follows, CT chest from 1/4/2019 shows bilateral scattered emphysematous changes, no masses or large nodules, no effusions. There is a hiatal hernia present. Official report as follows per Radiology  CT Results (most recent):  Results from Hospital Encounter encounter on 01/04/19   CT CHEST WO CONT    Narrative CT CHEST WITHOUT IV CONTRAST      COMPARISON: January 27, 2018. INDICATIONS: Pulmonary nodules. TECHNIQUE: Volumetric data acquisition was performed through the chest with a  multislice scanner. Reconstructions were created in the axial, coronal, and  sagittal planes. FINDINGS:     Thyroid/Base Of Neck: The thyroid is diffusely enlarged with a 2 cm nodule in  the isthmus region on the left. .    Lungs:   No acute infiltrates are evident. .   No mass lesions are seen. .  The lungs are emphysematous. A few 2 to 3 mm nodular foci are again noted. These are stable      Pleural Spaces: There is no pneumothorax or pleural fluid evident.   No pleural plaques are seen    Lymph Nodes:   Axillae: Normal in size and number. Mediastinum / Abrbara: Normal in size and number. Mediastinum, Great Vessels And Heart: There is no mediastinal mass. The heart and great vessels appear unremarkable. There is a moderate-sized hiatal hernia    Abdomen Structures Included: The included portions of the liver and spleen are unremarkable. .  There is a 2.7 cm adrenal adenoma on the right unchanged      Osseous Structures: Unremarkable for age. Impression IMPRESSION:     Chronic pulmonary disease is present with features primarily of emphysema. A few tiny nodular opacities are unchanged. A thyroid nodule is present. Atelectasis has been previously evaluated elsewhere  further assessment with ultrasound is recommended     CT findings are consistent with Lung RADS classification 2S      All CT scans at this facility are performed using dose optimization technique as  appropriate to the performed exam, to include automated exposure control,  adjustment of the mA and/or kV according to patient's size (Including  appropriate matching for site-specific examinations), or use of iterative  reconstruction technique. PFTs: Reviewed lacho on 1/16/18 suggested restrictive impairment - likely moderate obstruction underlying    TTE:  I have reviewed the patient's TTE results  No results found for this or any previous visit. Assessment and Plan:  63 y.o. male with:    Impression:  1. Gold stage 2 COPD, risk category B, with centrilobular and paraseptal emphysema on CT  2. Pulmonary nodules:  Seen on most recent CT from 1/27/18, stable on repeat CT from 1/4/2019  3. Tobacco dependence: 1PPD for 45 years  4. CAD: Had stent placement on 9/21/2018, follows with Dr. Shara Castillo  5. Adrenal adenoma:  2.7cm seen on CT scan -- reviewed old records, seen as far back as 2011 with no change, likely benign, no further workup required  6.   Thyroid nodule: 2 cm nodule located along the left isthmus    Plan:  -Subcentimeter nodules unchanged in size after 12 months. We will start LDCT yearly starting next year, orders placed  -Advised to followup with PCP regarding thyroid nodule. I offered an ENT referral, but he declined and stated that he would follow-up with his PCP. -Management of CAD per cardiology, continue dual antiplatelet therapy.  -Continue Anoro 1 puff once daily, advised patient to use every day, not as needed  -Prescribed rescue inhaler, albuterol as needed  -Advised patient to remain active  -I spent 12 minutes with patient regarding cessation of smoking cigarettes. I reviewed health risks of tobacco use including increased risk of MI, stroke, cancer, etc.  We reviewed various approaches to cessation including pills, patches, inhaler, gum, weaning self, \"cold turkey\", and smoking cessation classes. Patient expressed interest in Chantix, risks and side effects explained in detail. I prescribed Chantix 0.5 mg daily times 3 days, 0.5 mg twice daily for the next 4 days, 1 mg twice daily thereafter for 11 weeks. Explained to patient that he needs to quit at the end of the first week in order for the Chantix to be effective. RTC: Follow-up Disposition:  Return in about 4 months (around 5/15/2019).       Orders Placed This Encounter    CT LOW DOSE LUNG CANCER SCREENING    varenicline (CHANTIX) 0.5 mg tablet    varenicline (CHANTIX) 1 mg tablet    albuterol (PROVENTIL HFA, VENTOLIN HFA, PROAIR HFA) 90 mcg/actuation inhaler         Tabatha Hernandez MD/MPH     Pulmonary, Critical Care Medicine  [facility]

## 2019-08-14 RX ORDER — CLOPIDOGREL BISULFATE 75 MG/1
75 TABLET ORAL DAILY
Qty: 30 TAB | Refills: 0 | Status: SHIPPED | OUTPATIENT
Start: 2019-08-14 | End: 2019-10-24 | Stop reason: SDUPTHER

## 2019-10-24 RX ORDER — CLOPIDOGREL BISULFATE 75 MG/1
75 TABLET ORAL DAILY
Qty: 30 TAB | Refills: 0 | Status: SHIPPED | OUTPATIENT
Start: 2019-10-24 | End: 2019-10-30 | Stop reason: SDUPTHER

## 2019-10-30 ENCOUNTER — OFFICE VISIT (OUTPATIENT)
Dept: CARDIOLOGY CLINIC | Age: 59
End: 2019-10-30

## 2019-10-30 VITALS
HEIGHT: 67 IN | HEART RATE: 90 BPM | SYSTOLIC BLOOD PRESSURE: 134 MMHG | DIASTOLIC BLOOD PRESSURE: 88 MMHG | BODY MASS INDEX: 27.94 KG/M2 | WEIGHT: 178 LBS

## 2019-10-30 DIAGNOSIS — I10 ESSENTIAL HYPERTENSION: ICD-10-CM

## 2019-10-30 DIAGNOSIS — F17.200 SMOKER: ICD-10-CM

## 2019-10-30 DIAGNOSIS — R09.89 CAROTID BRUIT, UNSPECIFIED LATERALITY: ICD-10-CM

## 2019-10-30 DIAGNOSIS — I25.118 CORONARY ARTERY DISEASE OF NATIVE ARTERY OF NATIVE HEART WITH STABLE ANGINA PECTORIS (HCC): Primary | ICD-10-CM

## 2019-10-30 DIAGNOSIS — J43.9 PULMONARY EMPHYSEMA, UNSPECIFIED EMPHYSEMA TYPE (HCC): ICD-10-CM

## 2019-10-30 DIAGNOSIS — E78.5 DYSLIPIDEMIA: ICD-10-CM

## 2019-10-30 RX ORDER — SIMVASTATIN 40 MG/1
40 TABLET, FILM COATED ORAL
Qty: 30 TAB | Refills: 6 | Status: SHIPPED | OUTPATIENT
Start: 2019-10-30 | End: 2021-07-21 | Stop reason: ALTCHOICE

## 2019-10-30 RX ORDER — AMLODIPINE BESYLATE 5 MG/1
5 TABLET ORAL DAILY
Qty: 30 TAB | Refills: 6 | Status: SHIPPED | OUTPATIENT
Start: 2019-10-30 | End: 2021-07-21 | Stop reason: ALTCHOICE

## 2019-10-30 RX ORDER — CLOPIDOGREL BISULFATE 75 MG/1
75 TABLET ORAL DAILY
Qty: 90 TAB | Refills: 3 | Status: SHIPPED | OUTPATIENT
Start: 2019-10-30 | End: 2020-04-23 | Stop reason: SDUPTHER

## 2019-10-30 NOTE — PROGRESS NOTES
HISTORY OF PRESENT ILLNESS  Greg Lopez is a 63 y.o. male. Shortness of Breath   The history is provided by the patient. This is a chronic problem. The problem occurs intermittently. The problem has not changed since onset. Pertinent negatives include no ear pain, no neck pain, no wheezing, no rash and no leg swelling. Associated medical issues do not include CAD or heart failure. Review of Systems   Constitutional: Negative for chills and weight loss. HENT: Negative for congestion, ear discharge, ear pain, hearing loss, nosebleeds and tinnitus. Eyes: Negative for blurred vision. Respiratory: Negative for wheezing and stridor. Cardiovascular: Negative for leg swelling. Gastrointestinal: Negative for heartburn. Musculoskeletal: Negative for myalgias and neck pain. Skin: Negative for itching and rash. Neurological: Negative for tingling, tremors, focal weakness and loss of consciousness. Psychiatric/Behavioral: Negative for depression and suicidal ideas. Family History   Problem Relation Age of Onset    No Known Problems Mother     Heart Attack Father     Stroke Brother        Past Medical History:   Diagnosis Date    Chronic lung disease        Past Surgical History:   Procedure Laterality Date    HX HEART CATHETERIZATION  09/2018    HX HERNIA REPAIR         Social History     Tobacco Use    Smoking status: Current Every Day Smoker     Packs/day: 1.00     Years: 46.00     Pack years: 46.00     Start date: 7/11/1973    Smokeless tobacco: Never Used   Substance Use Topics    Alcohol use:  Yes       Allergies   Allergen Reactions    Atenolol Other (comments)     Pt c/o chest pain and is reluctant to take med due to being told that it could cause \"death\"    Nalbuphine Other (comments)     Extremely nauseous    Seasonale [Levonorgestrel-Ethinyl Estrad] Other (comments)     Not allergic to this medication per patient       Outpatient Medications Marked as Taking for the 10/30/19 encounter (Office Visit) with Leisa Carvalho MD   Medication Sig Dispense Refill    clopidogrel (PLAVIX) 75 mg tab Take 1 Tab by mouth daily. 90 Tab 3    simvastatin (ZOCOR) 40 mg tablet Take 1 Tab by mouth nightly. 30 Tab 6    amLODIPine (NORVASC) 5 mg tablet Take 1 Tab by mouth daily. 30 Tab 6        Visit Vitals  /88 (BP 1 Location: Right arm, BP Patient Position: Sitting)   Pulse 90   Ht 5' 7\" (1.702 m)   Wt 80.7 kg (178 lb)   BMI 27.88 kg/m²     Physical Exam   Constitutional: He is oriented to person, place, and time. He appears well-developed and well-nourished. No distress. HENT:   Head: Atraumatic. Mouth/Throat: No oropharyngeal exudate. Eyes: Conjunctivae are normal. Right eye exhibits no discharge. Left eye exhibits no discharge. No scleral icterus. Neck: Normal range of motion. Neck supple. No JVD present. No tracheal deviation present. No thyromegaly present. Cardiovascular: Normal rate and regular rhythm. Exam reveals no gallop. No murmur heard. Pulses:       Carotid pulses are on the right side with bruit. Pulmonary/Chest: Effort normal and breath sounds normal. No stridor. He has no wheezes. He has no rales. Abdominal: Soft. There is no tenderness. There is no rebound and no guarding. Musculoskeletal: Normal range of motion. He exhibits no edema or tenderness. Lymphadenopathy:     He has no cervical adenopathy. Neurological: He is alert and oriented to person, place, and time. He exhibits normal muscle tone. Skin: Skin is warm. He is not diaphoretic. Psychiatric: He has a normal mood and affect. His behavior is normal.     ekg sinus rhythm with no acute st-t changes  Echo and lexiscan from 2012 reviewed  09/21/18   CARDIAC PROCEDURE 09/21/2018 9/21/2018    Narrative Single vessel coronary artery disease. Preserved LV function. S/p ptca/stent to OM1.   Continue DAPT     Signed by: Leisa Carvalho MD     ASSESSMENT and PLAN    ICD-10-CM ICD-9-CM    1. Coronary artery disease of native artery of native heart with stable angina pectoris ([facility] I25.118 414.01      413.9    2. Pulmonary emphysema, unspecified emphysema type ([facility] J43.9 492.8    3. Essential hypertension I10 401.9    4. Smoker F17.200 305.1    5. Dyslipidemia E78.5 272.4    6. Carotid bruit, unspecified laterality R09.89 785.9 DUPLEX CAROTID BILATERAL     Orders Placed This Encounter    clopidogrel (PLAVIX) 75 mg tab     Sig: Take 1 Tab by mouth daily. Dispense:  90 Tab     Refill:  3    simvastatin (ZOCOR) 40 mg tablet     Sig: Take 1 Tab by mouth nightly. Dispense:  30 Tab     Refill:  6    amLODIPine (NORVASC) 5 mg tablet     Sig: Take 1 Tab by mouth daily. Dispense:  30 Tab     Refill:  6     Follow-up and Dispositions    · Return in about 3 months (around 1/30/2020). very strongly urged to quit smoking to reduce cardiovascular risk  cardiovascular risk and specific lipid/LDL goals reviewed  use of aspirin to prevent MI and TIA's discussed. Patient with long standing h/o smoking and family h/o premature CAD seen for chest pain- tightness radiating to left jaw associated with mild sob. Patient underwent cardiac cath subsequent PCI to obtuse marginal 1 artery. Patient returns to clinic after 1 year. Currently not on dual antiplatelets/statin or blood pressure medicines. Continues to smoke cigarettes. Discussed at length regarding the need for medication compliance. We will resume aspirin, Plavix, Zocor and Norvasc. Has carotid bruit. Will obtain carotid ultrasound prior to next appointment.

## 2019-10-30 NOTE — PROGRESS NOTES
1. Have you been to the ER, urgent care clinic since your last visit? Hospitalized since your last visit? No  When:  Where:  Reason for visit:     2. Have you seen or consulted any other health care providers outside of the [address] [place] [address] since your last visit? Include any pap smears or colon screening. No     3. Since your last visit, have you had any of the following symptoms?      dizziness. Explain: off and on    4. Have you had any blood work, X-rays or cardiac testing? No     Requested: NO     In [facility]: NO    5. Where do you normally have your labs drawn?   [facility]    6. Do you need any refills today?    yes

## 2019-11-13 ENCOUNTER — TELEPHONE (OUTPATIENT)
Dept: CARDIOLOGY CLINIC | Age: 59
End: 2019-11-13

## 2019-11-13 NOTE — TELEPHONE ENCOUNTER
Called and advised patient carotid looks ok and recommends smoking cessation, patient states understanding.

## 2019-11-13 NOTE — TELEPHONE ENCOUNTER
No significant internal carotid artery stenosis.  Continue current meds and recommend smoking cessation

## 2019-12-03 ENCOUNTER — NURSE NAVIGATOR (OUTPATIENT)
Dept: OTHER | Age: 59
End: 2019-12-03

## 2019-12-03 NOTE — NURSE NAVIGATOR
Referring Provider: Maico Islas MD      Lung Cancer Risk Profile:   Age: 63  Gender: Male  Height: 67\"  Weight: 171#    Smoking History:  Smoking Status: current use  # years smokin  # years quit: 0  Packs/day: 1.5  Pack years: 71    Patient discussed smoking cessation with PCP: Yes, per provider note    Patient participated in shared decision making process with PCP: Unknown    Patient is currently experiencing symptoms: No, per patient report    If yes what symptoms:     Co-Morbidities:  COPD/Emphysema, CAD    Cancer History:      Additional Risk Factors:    Exposure to asbestos, diesel fumes, and second hand smoke      Patient's smoking history discussed via phone. Patient meets LDCT lung cancer screening criteria.  Call transferred to central scheduling to schedule exam.      [place] LopezN, RN, [address] [facility] Nurse Navigator

## 2020-01-22 ENCOUNTER — OFFICE VISIT (OUTPATIENT)
Dept: CARDIOLOGY CLINIC | Age: 60
End: 2020-01-22

## 2020-01-22 VITALS
DIASTOLIC BLOOD PRESSURE: 84 MMHG | BODY MASS INDEX: 28.56 KG/M2 | HEIGHT: 67 IN | SYSTOLIC BLOOD PRESSURE: 139 MMHG | HEART RATE: 102 BPM | WEIGHT: 182 LBS

## 2020-01-22 DIAGNOSIS — E78.5 DYSLIPIDEMIA: ICD-10-CM

## 2020-01-22 DIAGNOSIS — I10 ESSENTIAL HYPERTENSION: ICD-10-CM

## 2020-01-22 DIAGNOSIS — J43.9 PULMONARY EMPHYSEMA, UNSPECIFIED EMPHYSEMA TYPE (HCC): ICD-10-CM

## 2020-01-22 DIAGNOSIS — I25.118 CORONARY ARTERY DISEASE OF NATIVE ARTERY OF NATIVE HEART WITH STABLE ANGINA PECTORIS (HCC): Primary | ICD-10-CM

## 2020-01-22 DIAGNOSIS — F17.200 SMOKER: ICD-10-CM

## 2020-01-22 RX ORDER — LISINOPRIL 10 MG/1
10 TABLET ORAL DAILY
Qty: 30 TAB | Refills: 6 | Status: SHIPPED | OUTPATIENT
Start: 2020-01-22 | End: 2020-04-23 | Stop reason: SDUPTHER

## 2020-01-22 NOTE — PATIENT INSTRUCTIONS
Learning About Coronary Artery Disease (CAD)  What is coronary artery disease? Coronary artery disease (CAD) occurs when plaque builds up in the arteries that bring oxygen-rich blood to your heart. Plaque is a fatty substance made of cholesterol, calcium, and other substances in the blood. This process is called hardening of the arteries, or atherosclerosis. What happens when you have coronary artery disease? · Plaque may narrow the coronary arteries. Narrowed arteries cause poor blood flow. This can lead to angina symptoms such as chest pain or discomfort. If blood flow is completely blocked, you could have a heart attack. · You can slow CAD and reduce the risk of future problems by making changes in your lifestyle. These include quitting smoking and eating heart-healthy foods. · Treatments for CAD, along with changes in your lifestyle, can help you live a longer and healthier life. How can you prevent coronary artery disease? · Do not smoke. It may be the best thing you can do to prevent heart disease. If you need help quitting, talk to your doctor about stop-smoking programs and medicines. These can increase your chances of quitting for good. · Be active. Get at least 30 minutes of exercise on most days of the week. Walking is a good choice. You also may want to do other activities, such as running, swimming, cycling, or playing tennis or team sports. · Eat heart-healthy foods. Eat more fruits and vegetables and less foods that contain saturated and trans fats. Limit alcohol, sodium, and sweets. · Stay at a healthy weight. Lose weight if you need to. · Manage other health problems such as diabetes, high blood pressure, and high cholesterol. How is coronary artery disease treated? · Your doctor will suggest that you make lifestyle changes. For example, your doctor may ask you to eat healthy foods, quit smoking, lose extra weight, and be more active. · You will have to take medicines.   · Your doctor may suggest a procedure to open narrowed or blocked arteries. This is called angioplasty. Or your doctor may suggest using healthy blood vessels to create detours around narrowed or blocked arteries. This is called bypass surgery. Follow-up care is a key part of your treatment and safety. Be sure to make and go to all appointments, and call your doctor if you are having problems. It's also a good idea to know your test results and keep a list of the medicines you take. Where can you learn more? Go to http://halina-mikie.info/. Enter ([phone] in the search box to learn more about \"Learning About Coronary Artery Disease (CAD). \"  Current as of: April 9, 2019  Content Version: 12.2  © 2514-6452 [email or web address]. Care instructions adapted under license by [employer] (which disclaims liability or warranty for this information). If you have questions about a medical condition or this instruction, always ask your healthcare professional. [place] [address] any warranty or liability for your use of this information. DecideQuick Activation    Thank you for requesting access to DecideQuick. Please follow the instructions below to securely access and download your online medical record. DecideQuick allows you to send messages to your doctor, view your test results, renew your prescriptions, schedule appointments, and more. How Do I Sign Up? 1. In your internet browser, go to [email or web address]. 2. Click on the First Time User? Click Here link in the Sign In box. You will see the New Member Sign Up page. 3. Enter your DecideQuick Access Code exactly as it appears below. You will not need to use this code after youve completed the sign-up process. If you do not sign up before the expiration date, you must request a new code.     DecideQuick Access Code: [email or web address]  Expires: 2/14/2020  7:55 AM (This is the date your DecideQuick access code will )    4. Enter the last four digits of your Social Security Number (xxxx) and Date of Birth (mm/dd/yyyy) as indicated and click Submit. You will be taken to the next sign-up page. 5. Create a LANDBAY ID. This will be your LANDBAY login ID and cannot be changed, so think of one that is secure and easy to remember. 6. Create a LANDBAY password. You can change your password at any time. 7. Enter your Password Reset Question and Answer. This can be used at a later time if you forget your password. 8. Enter your e-mail address. You will receive e-mail notification when new information is available in [address] 9. Click Sign Up. You can now view and download portions of your medical record. 10. Click the Download Summary menu link to download a portable copy of your medical information. Additional Information    If you have questions, please visit the Frequently Asked Questions section of the LANDBAY website at [email or web address] Remember, LANDBAY is NOT to be used for urgent needs. For medical emergencies, dial 911.

## 2020-01-22 NOTE — PROGRESS NOTES
1. Have you been to the ER, urgent care clinic since your last visit? Hospitalized since your last visit? No     2. Have you seen or consulted any other health care providers outside of the [address] [place] [address] since your last visit? Include any pap smears or colon screening. Yes Where: Dr Billy Herndon     3. Since your last visit, have you had any of the following symptoms? .       4. Have you had any blood work, X-rays or cardiac testing? No         5. Where do you normally have your labs drawn? [facility]    6. Do you need any refills today?    No

## 2020-01-22 NOTE — PROGRESS NOTES
HISTORY OF PRESENT ILLNESS  Rolando Cole is a 63 y.o. male. Shortness of Breath   The history is provided by the patient. This is a chronic problem. The problem occurs intermittently. The problem has not changed since onset. Pertinent negatives include no ear pain, no neck pain, no wheezing, no rash and no leg swelling. Associated medical issues do not include CAD or heart failure. Review of Systems   Constitutional: Negative for chills and weight loss. HENT: Negative for congestion, ear discharge, ear pain, hearing loss, nosebleeds and tinnitus. Eyes: Negative for blurred vision. Respiratory: Negative for wheezing and stridor. Cardiovascular: Negative for leg swelling. Gastrointestinal: Negative for heartburn. Musculoskeletal: Negative for myalgias and neck pain. Skin: Negative for itching and rash. Neurological: Negative for tingling, tremors, focal weakness and loss of consciousness. Psychiatric/Behavioral: Negative for depression and suicidal ideas. Family History   Problem Relation Age of Onset    No Known Problems Mother     Heart Attack Father     Stroke Brother        Past Medical History:   Diagnosis Date    Chronic lung disease        Past Surgical History:   Procedure Laterality Date    HX HEART CATHETERIZATION  09/2018    HX HERNIA REPAIR         Social History     Tobacco Use    Smoking status: Current Every Day Smoker     Packs/day: 1.00     Years: 46.00     Pack years: 46.00     Start date: 7/11/1973    Smokeless tobacco: Never Used   Substance Use Topics    Alcohol use:  Yes       Allergies   Allergen Reactions    Atenolol Other (comments)     Pt c/o chest pain and is reluctant to take med due to being told that it could cause \"death\"    Nalbuphine Other (comments)     Extremely nauseous    Seasonale [Levonorgestrel-Ethinyl Estrad] Other (comments)     Not allergic to this medication per patient       Outpatient Medications Marked as Taking for the 1/22/20 encounter (Office Visit) with Tasneem Figueroa MD   Medication Sig Dispense Refill    lisinopril (PRINIVIL, ZESTRIL) 10 mg tablet Take 1 Tab by mouth daily. 30 Tab 6    clopidogrel (PLAVIX) 75 mg tab Take 1 Tab by mouth daily. 90 Tab 3    simvastatin (ZOCOR) 40 mg tablet Take 1 Tab by mouth nightly. 30 Tab 6    meclizine (ANTIVERT) 25 mg tablet Take 25 mg by mouth three (3) times daily as needed.  albuterol (PROVENTIL HFA, VENTOLIN HFA, PROAIR HFA) 90 mcg/actuation inhaler Take 1-2 Puffs by inhalation every four (4) hours as needed for Wheezing or Shortness of Breath. 3 Inhaler 4    aspirin 81 mg chewable tablet Take 1 Tab by mouth daily. 90 Tab 3        Visit Vitals  /84   Pulse (!) 102   Ht 5' 7\" (1.702 m)   Wt 82.6 kg (182 lb)   BMI 28.51 kg/m²     Physical Exam   Constitutional: He is oriented to person, place, and time. He appears well-developed and well-nourished. No distress. HENT:   Head: Atraumatic. Mouth/Throat: No oropharyngeal exudate. Eyes: Conjunctivae are normal. Right eye exhibits no discharge. Left eye exhibits no discharge. No scleral icterus. Neck: Normal range of motion. Neck supple. No JVD present. No tracheal deviation present. No thyromegaly present. Cardiovascular: Normal rate and regular rhythm. Exam reveals no gallop. No murmur heard. Pulses:       Carotid pulses are on the right side with bruit. Pulmonary/Chest: Effort normal and breath sounds normal. No stridor. He has no wheezes. He has no rales. Abdominal: Soft. There is no tenderness. There is no rebound and no guarding. Musculoskeletal: Normal range of motion. General: No tenderness or edema. Lymphadenopathy:     He has no cervical adenopathy. Neurological: He is alert and oriented to person, place, and time. He exhibits normal muscle tone. Skin: Skin is warm. He is not diaphoretic. Psychiatric: He has a normal mood and affect.  His behavior is normal.     ekg sinus rhythm with no acute st-t changes  Echo and lexiscan from 2012 reviewed  09/21/18   CARDIAC PROCEDURE 09/21/2018 9/21/2018    Narrative Single vessel coronary artery disease. Preserved LV function. S/p ptca/stent to OM1. Continue DAPT     Signed by: Hamilton Warren MD     ASSESSMENT and PLAN    ICD-10-CM ICD-9-CM    1. Coronary artery disease of native artery of native heart with stable angina pectoris ([facility] I25.118 414.01      413.9    2. Pulmonary emphysema, unspecified emphysema type ([facility] J43.9 492.8    3. Essential hypertension I10 401.9    4. Smoker F17.200 305.1    5. Dyslipidemia E78.5 272.4      Orders Placed This Encounter    lisinopril (PRINIVIL, ZESTRIL) 10 mg tablet     Sig: Take 1 Tab by mouth daily. Dispense:  30 Tab     Refill:  6     Follow-up and Dispositions    · Return in about 6 months (around 7/22/2020). very strongly urged to quit smoking to reduce cardiovascular risk  cardiovascular risk and specific lipid/LDL goals reviewed  use of aspirin to prevent MI and TIA's discussed. Patient with long standing h/o smoking and family h/o premature CAD seen for chest pain- tightness radiating to left jaw associated with mild sob. Patient underwent cardiac cath subsequent PCI to obtuse marginal 1 artery. Had recent carotid study -revealed less than 50% internal carotid artery stenosis. Currently not on amlodipine. Wants to use only lisinopril. Will send new prescription for 10 mg tablets. Continue aspirin Plavix/statin. Follow-up in 6 months. Discussed at length regarding need for smoking cessation.

## 2020-02-18 ENCOUNTER — TELEPHONE (OUTPATIENT)
Dept: PULMONOLOGY | Age: 60
End: 2020-02-18

## 2020-02-18 NOTE — TELEPHONE ENCOUNTER
Pt states he has been coughing and congested x 8 days. Once he felt feverish but not today. This am with coughing he had a ting of blood in sputum. Pt going to [facility] now to be evaluated for uri.

## 2020-03-02 DIAGNOSIS — Z87.891 HISTORY OF TOBACCO USE, PRESENTING HAZARDS TO HEALTH: Primary | ICD-10-CM

## 2020-03-02 NOTE — PROGRESS NOTES
Order placed for LDCT, per Verbal Order from Dr. Leighann Baez on 3/2/2020. Last office visit: 1/15/2019  Follow up Visit: Due February 2020    Provider is aware of last office visit and follow up. No further action requested from provider.

## 2020-03-13 ENCOUNTER — HOSPITAL ENCOUNTER (OUTPATIENT)
Dept: CT IMAGING | Age: 60
Discharge: HOME | End: 2020-03-13
Attending: INTERNAL MEDICINE
Payer: MEDICAID

## 2020-03-13 DIAGNOSIS — Z87.891 HISTORY OF TOBACCO USE, PRESENTING HAZARDS TO HEALTH: ICD-10-CM

## 2020-03-13 PROCEDURE — G0297 LDCT FOR LUNG CA SCREEN: HCPCS

## 2020-03-24 ENCOUNTER — TELEPHONE (OUTPATIENT)
Dept: PULMONOLOGY | Age: 60
End: 2020-03-24

## 2020-03-24 NOTE — TELEPHONE ENCOUNTER
Pt c/o fever, cough, congestion, wheezing and sob starting 3 days ago. Pt not sure what his temp is due to no thermometer. Pt states he has not traveled by has been at work. Pt advised he has to be evalutaed for the sxs he is having. He states he is going to urgent care now.  Pt advised to put mask on as soon as he arrives at the facility

## 2020-03-24 NOTE — TELEPHONE ENCOUNTER
Per pt he is wheezing, and has a head cold and feels tight in chest.  Can dr call in antibiotics? Please call [phone]. Pt uses [facility] on [address].

## 2020-04-01 ENCOUNTER — TELEPHONE (OUTPATIENT)
Dept: PULMONOLOGY | Age: 60
End: 2020-04-01

## 2020-04-14 ENCOUNTER — TELEPHONE (OUTPATIENT)
Dept: PULMONOLOGY | Age: 60
End: 2020-04-14

## 2020-04-14 NOTE — TELEPHONE ENCOUNTER
Pt called stating that he had a ct scan done recently and Dr. Carleen Young told him that he had a nodule on his adrenal glad. He would like a ref to get it checked out.  Please call [phone]

## 2020-04-14 NOTE — TELEPHONE ENCOUNTER
Pt had already been informed the CT was stable. Pt advised again. Pt was last seen here 1/2019.  Will make appt as soon as the virus lets up and visits opened back up

## 2020-04-23 RX ORDER — LISINOPRIL 10 MG/1
10 TABLET ORAL DAILY
Qty: 90 TAB | Refills: 3 | Status: SHIPPED | OUTPATIENT
Start: 2020-04-23 | End: 2022-04-08

## 2020-04-23 RX ORDER — CLOPIDOGREL BISULFATE 75 MG/1
75 TABLET ORAL DAILY
Qty: 90 TAB | Refills: 3 | Status: SHIPPED | OUTPATIENT
Start: 2020-04-23 | End: 2021-07-28 | Stop reason: SDUPTHER

## 2020-06-05 ENCOUNTER — VIRTUAL VISIT (OUTPATIENT)
Dept: PULMONOLOGY | Age: 60
End: 2020-06-05

## 2020-06-05 DIAGNOSIS — R06.09 DYSPNEA ON EXERTION: ICD-10-CM

## 2020-06-05 DIAGNOSIS — I25.119 CORONARY ARTERY DISEASE INVOLVING NATIVE HEART WITH ANGINA PECTORIS, UNSPECIFIED VESSEL OR LESION TYPE (HCC): ICD-10-CM

## 2020-06-05 DIAGNOSIS — J44.9 COPD, GROUP D, BY GOLD 2017 CLASSIFICATION (HCC): Primary | ICD-10-CM

## 2020-06-05 DIAGNOSIS — F17.210 CIGARETTE NICOTINE DEPENDENCE WITHOUT COMPLICATION: ICD-10-CM

## 2020-06-05 DIAGNOSIS — I21.9 MYOCARDIAL INFARCTION, UNSPECIFIED MI TYPE, UNSPECIFIED ARTERY (HCC): ICD-10-CM

## 2020-06-05 DIAGNOSIS — Z87.891 PERSONAL HISTORY OF TOBACCO USE, PRESENTING HAZARDS TO HEALTH: ICD-10-CM

## 2020-06-05 DIAGNOSIS — R06.02 SHORTNESS OF BREATH: ICD-10-CM

## 2020-06-05 RX ORDER — VARENICLINE TARTRATE 1 MG/1
1 TABLET, FILM COATED ORAL 2 TIMES DAILY
Qty: 180 TAB | Refills: 0 | Status: SHIPPED | OUTPATIENT
Start: 2020-06-15 | End: 2020-09-13

## 2020-06-05 RX ORDER — VARENICLINE TARTRATE 0.5 MG/1
TABLET, FILM COATED ORAL
Qty: 11 TAB | Refills: 0 | Status: SHIPPED | OUTPATIENT
Start: 2020-06-05 | End: 2021-07-21 | Stop reason: ALTCHOICE

## 2020-06-05 RX ORDER — ALBUTEROL SULFATE 90 UG/1
1-2 AEROSOL, METERED RESPIRATORY (INHALATION)
Qty: 3 INHALER | Refills: 4 | Status: SHIPPED | OUTPATIENT
Start: 2020-06-05

## 2020-06-05 NOTE — PROGRESS NOTES
[address] [place] [address], [facility] [address] [address] [facility]  Pulmonary, Critical Care, and Sleep Medicine    Pulmonary Followup  Name: Yg Fernandez 63 y.o. male  MRN: [id number]  : 1960  Service Date: 20  Chief Complaint:   Chief Complaint   Patient presents with    Results     LDCT  Scan        Yg Fernandez is a 63 y.o. male who was seen by synchronous (real-time) audio-video technology on 2020. Consent:  He and/or his healthcare decision maker is aware that this patient-initiated Telehealth encounter is a billable service, with coverage as determined by his insurance carrier. He is aware that he may receive a bill and has provided verbal consent to proceed: Yes    I was in the office while conducting this encounter. History of Present Illness:  Yg Fernandez is a 63 y.o. male, who presents to Pulmonary clinic for followup of COPD. Pt last seen in our clinic on 1/15/2019. In the interval, patient reports his symptoms of worsened some. He had a recent COPD exacerbation requiring prednisone and antibiotics at the end of March, patient went to the emergency room at [facility].  Since that time, patient still reports ongoing dyspnea, mMRC of 3. Patient reports he got dyspneic walking on the street and back, about 100 yards or so. Patient reports symptoms are only alleviated with rest, only mildly alleviated with PRN albuterol, worsened with heat, no other modifying factors. Patient reports he still smokes 1 pack/day. No hemoptysis  Denies chest pain, night sweats, LE swelling, orthopnea, abdominal pain.       Allergies: I have reviewed the allergy hx  Allergies   Allergen Reactions    Atenolol Other (comments)     Pt c/o chest pain and is reluctant to take med due to being told that it could cause \"death\"    Nalbuphine Other (comments)     Extremely nauseous    Seasonale [Levonorgestrel-Ethinyl Estrad] Other (comments)     Not allergic to this medication per patient       Medications:  I have reviewed the patient's medications    Current Outpatient Medications:     clopidogreL (PLAVIX) 75 mg tab, Take 1 Tab by mouth daily. , Disp: 90 Tab, Rfl: 3    lisinopriL (PRINIVIL, ZESTRIL) 10 mg tablet, Take 1 Tab by mouth daily. , Disp: 90 Tab, Rfl: 3    simvastatin (ZOCOR) 40 mg tablet, Take 1 Tab by mouth nightly., Disp: 30 Tab, Rfl: 6    aspirin 81 mg chewable tablet, Take 1 Tab by mouth daily. , Disp: 90 Tab, Rfl: 3    amLODIPine (NORVASC) 5 mg tablet, Take 1 Tab by mouth daily. , Disp: 30 Tab, Rfl: 6    meclizine (ANTIVERT) 25 mg tablet, Take 25 mg by mouth three (3) times daily as needed. , Disp: , Rfl:     varenicline (CHANTIX) 0.5 mg tablet, Take 0.5mg once a day for 3 days followed by 0.5mg twice a day for 4 days, followed by 1mg twice a day, Disp: 11 Tab, Rfl: 0    albuterol (PROVENTIL HFA, VENTOLIN HFA, PROAIR HFA) 90 mcg/actuation inhaler, Take 1-2 Puffs by inhalation every four (4) hours as needed for Wheezing or Shortness of Breath., Disp: 3 Inhaler, Rfl: 4    umeclidinium-vilanterol (ANORO ELLIPTA) 62.5-25 mcg/actuation inhaler, Take 1 Puff by inhalation daily. , Disp: 1 Inhaler, Rfl: 11  Past Medical History:   Diagnosis Date    Chronic lung disease      Past Surgical History:   Procedure Laterality Date    HX HEART CATHETERIZATION  09/2018    HX HERNIA REPAIR       Family History   Problem Relation Age of Onset    No Known Problems Mother     Heart Attack Father     Stroke Brother      Social History     Tobacco Use    Smoking status: Current Every Day Smoker     Packs/day: 1.00     Years: 46.00     Pack years: 46.00     Start date: 7/11/1973    Smokeless tobacco: Never Used   Substance Use Topics    Alcohol use: Yes    Drug use: No     Immunizations:  I have reviewed the patient's immunizations    There is no immunization history on file for this patient.     Review of Systems:  A complete review of systems was performed as stated in the HPI, all others are negative. Objective:    Physical Exam:  There were no vitals taken for this visit. This is a virtual visit    Constitutional: [x] Appears well-developed and well-nourished [x] No apparent distress      [] Abnormal  (Limited exam due to video visit)    Mental status: [x] Alert and awake  [x] Oriented to person/place/time [x] Able to follow commands    [] Abnormal -     Eyes:   EOM    [x]  Normal    [] Abnormal -   Sclera  [x]  Normal    [] Abnormal -          Discharge [x]  None visible   [] Abnormal   (limited exam due to video visit)     HENT: [x] Normocephalic, atraumatic, extra ocular movement appears intact, nasal bridge midline, no nasal drainage   [] Abnormal  [x] Mouth/Throat: Mucous membranes are moist    External Ears [x] Normal, no drainage or discharge[] Abnormal -  (limited exam due to video visit)     Neck: [x] No visualized mass, trachea midline [] Abnormal -   (limited exam due to video visit)     Pulmonary/Chest: [x] Respiratory effort normal   [x] No visualized signs of difficulty breathing or respiratory distress        [] Abnormal  (limited exam due to video visit)      Musculoskeletal:   [x] Normal movement of arms with no signs of ataxia         [x] Normal range of motion of neck        [] Abnormal  (limited exam due to video visit)     Neurological:        [x] No Facial Asymmetry (Cranial nerve 7 motor function) (limited exam due to video visit)          [x] No gaze palsy        [] Abnormal  (limited exam due to video visit)         Skin:        [x] No significant exanthematous lesions or discoloration noted on facial skin         [] Abnormal   (limited exam due to video visit)            Psychiatric:       [x] Normal Affect, thought content, processing  [] Abnormal  [x] No Hallucinations      Labs:   I have reviewed the patient's available labs  Lab Results   Component Value Date/Time    WBC 10.1 09/13/2018 04:59 PM HGB 15.0 09/13/2018 04:59 PM    HCT 42.6 09/13/2018 04:59 PM    PLATELET 203 64/56/9059 04:59 PM    MCV 91.2 09/13/2018 04:59 PM     Lab Results   Component Value Date/Time    Sodium 139 09/13/2018 04:59 PM    Potassium 3.8 09/13/2018 04:59 PM    Chloride 104 09/13/2018 04:59 PM    CO2 26 09/13/2018 04:59 PM    Anion gap 9 09/13/2018 04:59 PM    Glucose 161 (H) 09/13/2018 04:59 PM    BUN 12 09/13/2018 04:59 PM    Creatinine 0.88 09/13/2018 04:59 PM    BUN/Creatinine ratio 14 09/13/2018 04:59 PM    GFR est AA >60 09/13/2018 04:59 PM    GFR est non-AA >60 09/13/2018 04:59 PM    Calcium 8.1 (L) 09/13/2018 04:59 PM    Bilirubin, total 0.4 09/13/2018 04:59 PM    Alk. phosphatase 85 09/13/2018 04:59 PM    Protein, total 6.7 09/13/2018 04:59 PM    Albumin 3.4 09/13/2018 04:59 PM    Globulin 3.3 09/13/2018 04:59 PM    A-G Ratio 1.0 09/13/2018 04:59 PM    ALT (SGPT) 25 09/13/2018 04:59 PM       Imaging:  I have personally reviewed patient's imaging as follows, low-dose CT scan from 3/13/2020 shows scattered bilateral emphysematous changes, no masses, infiltrates, nodules, effusions. There is a persistent hiatal hernia present. Official report as follows per Radiology  CT Results (most recent):  Results from Hospital Encounter encounter on 03/13/20   CT LOW DOSE LUNG CANCER SCREENING    Narrative CT of the chest for lung cancer screening    HISTORY: Lung screening. History of tobacco use for 55 pack-years    COMPARISON: CT 1/4/19    TECHNIQUE: Low dose unenhanced multislice helical CT was performed from the  thoracic inlet to the adrenal glands without intravenous contrast  administration. Contiguous [1.25 mm] axial images were reconstructed and lung  and soft tissue windows were generated. Coronal MIP and sagittal reformations  were generated.     All CT scans at this facility performed using dose optimization techniques as  appreciated to a performed exam, to include automated exposure control,  adjustment of the mA and or KU according to patient size (including appropriate  matching for site specific examination), or use of iterative reconstruction  technique. FINDINGS:    There are several sub-3 mm nodules in the bilateral subpleural regions, stable. No new nodule, mass, airspace disease or edema. Chronic bronchial wall  thickening and mild emphysema again seen. There is no pleural effusion or pneumothorax. The absence of intravenous contrast reduces the sensitivity for evaluation of  the mediastinum and upper abdominal organs. No mediastinal or hilar or axillary  adenopathy is appreciated. The heart is not enlarged. No pericardial effusion. Moderate coronary artery calcifications present. The aorta has a normal contour  with no evidence of aneurysm. The thyroid appears unremarkable. Moderate hiatal  hernia present. The bones and soft tissues of the chest wall are within normal  limits. The visualized portions of the upper abdominal organs shows stable right  adrenal adenoma. Impression IMPRESSION:     1. Stable sub-3 mm nodules. 2. Mild COPD. 3. Moderate coronary artery calcifications. Stable right adrenal adenoma. LUNG RADS ASSESSMENT CATEGORIES:    Lung RADS 2 - Benign appearance or behavior. Management:  Continue annual screening with low dose CT in 12 months. Thank you for your referral.        PFTs: Reviewed lacho on 1/16/18 suggested restrictive impairment - likely moderate obstruction underlying    TTE:  I have reviewed the patient's TTE results  No results found for this or any previous visit. Assessment and Plan:  63 y.o. male with:    Impression:  1. Gold stage 2 COPD, risk category D, with centrilobular and paraseptal emphysema on CT: Patient had recent COPD exacerbation requiring emergency room visit  2. Pulmonary nodules:  Seen on most recent CT from 1/27/18, stable on repeat CT from 1/4/2019 and on lung cancer screening CT from 3/2020  3. Tobacco dependence: 1PPD for 45 years  4. CAD: Had stent placement on 9/21/2018, follows with Dr. Duran Oneill  5. Adrenal adenoma:  2.7cm seen on CT scan from 2019 -- reviewed old records, seen as far back as 2011 with no change, likely benign, no further workup required    Plan:  -Reviewed low-dose lung cancer screening CT with patient, lung RADS 2, repeat low-dose CT scan in 1 year (March 2021)  -6-minute walk test at next visit  -Advised to followup with PCP regarding thyroid nodule  -Management of CAD per cardiology, continue dual antiplatelet therapy.  -Continue Anoro 1 puff once daily, advised patient to use every day  -Prescribed rescue inhaler, albuterol as needed  -Advised patient to remain active  -I spent 12 minutes with patient regarding cessation of smoking cigarettes. I reviewed health risks of tobacco use including increased risk of MI, stroke, cancer, etc.  We reviewed various approaches to cessation including pills, patches, inhaler, gum, weaning self, \"cold turkey\", and smoking cessation classes. Patient expressed interest in Chantix, risks and side effects explained in detail. I prescribed Chantix 0.5 mg daily times 3 days, 0.5 mg twice daily for the next 4 days, 1 mg twice daily thereafter for 11 weeks. Explained to patient that he needs to quit at the end of the first week in order for the Chantix to be effective. Follow-up and Dispositions    · Return in about 6 months (around 12/5/2020). Orders Placed This Encounter    CT LOW DOSE LUNG CANCER SCREENING    albuterol (PROVENTIL HFA, VENTOLIN HFA, PROAIR HFA) 90 mcg/actuation inhaler    varenicline (Chantix) 0.5 mg tablet    varenicline (CHANTIX) 1 mg tablet         We discussed the expected course, resolution and complications of the diagnosis(es) in detail. Medication risks, benefits, costs, interactions, and alternatives were discussed as indicated. I advised him to contact the office if his condition worsens, changes or fails to improve as anticipated.  He expressed understanding with the diagnosis(es) and plan. Pursuant to the emergency declaration under the [facility], [facility] waiver authority and the [employer] Act, this Virtual  Visit was conducted, with patient's consent, to reduce the patient's risk of exposure to COVID-19 and provide continuity of care for an established patient. Services were provided through a video synchronous discussion virtually to substitute for in-person clinic visit.     Anjelica Emerson MD/MPH     Pulmonary, Critical Care Medicine  [facility]

## 2020-06-11 ENCOUNTER — VIRTUAL VISIT (OUTPATIENT)
Dept: CARDIOLOGY CLINIC | Age: 60
End: 2020-06-11

## 2020-06-11 DIAGNOSIS — I25.118 CORONARY ARTERY DISEASE OF NATIVE ARTERY OF NATIVE HEART WITH STABLE ANGINA PECTORIS (HCC): Primary | ICD-10-CM

## 2020-06-11 DIAGNOSIS — E78.5 DYSLIPIDEMIA: ICD-10-CM

## 2020-06-11 DIAGNOSIS — J43.9 PULMONARY EMPHYSEMA, UNSPECIFIED EMPHYSEMA TYPE (HCC): ICD-10-CM

## 2020-06-11 DIAGNOSIS — F17.200 SMOKER: ICD-10-CM

## 2020-06-11 DIAGNOSIS — R09.89 CAROTID BRUIT, UNSPECIFIED LATERALITY: ICD-10-CM

## 2020-06-11 DIAGNOSIS — I10 ESSENTIAL HYPERTENSION: ICD-10-CM

## 2020-06-11 NOTE — PROGRESS NOTES
Ashok Armas is a 63 y.o. male who was seen by synchronous (real-time) audio-video technology on 6/11/2020. Consent:  He and/or his healthcare decision maker is aware that this patient-initiated Telehealth encounter is a billable service, with coverage as determined by his insurance carrier. He is aware that he may receive a bill and has provided verbal consent to proceed: Yes    712  Subjective:   Ashok Armas was seen for Hospital Follow Up (Patient was in [facility] ER) and Hypertension    Patient is a 72-year-old male with history of CAD status post PCI admitted to emergency room with orthostatic hypotension and dizziness. Lisinopril was discontinued. Seen for follow-up. Denies any further episodes of dizziness. No syncope. Has mild stable dyspnea prior likely from emphysema. No orthopnea or PND. No lower extremity swelling. Family History   Problem Relation Age of Onset    No Known Problems Mother     Heart Attack Father     Stroke Brother      Past Surgical History:   Procedure Laterality Date    HX HEART CATHETERIZATION  09/2018    HX HERNIA REPAIR       Allergies   Allergen Reactions    Atenolol Other (comments)     Pt c/o chest pain and is reluctant to take med due to being told that it could cause \"death\"    Nalbuphine Other (comments)     Extremely nauseous    Seasonale [Levonorgestrel-Ethinyl Estrad] Other (comments)     Not allergic to this medication per patient       Current Outpatient Medications:     albuterol (PROVENTIL HFA, VENTOLIN HFA, PROAIR HFA) 90 mcg/actuation inhaler, Take 1-2 Puffs by inhalation every four (4) hours as needed for Wheezing or Shortness of Breath., Disp: 3 Inhaler, Rfl: 4    clopidogreL (PLAVIX) 75 mg tab, Take 1 Tab by mouth daily. , Disp: 90 Tab, Rfl: 3    simvastatin (ZOCOR) 40 mg tablet, Take 1 Tab by mouth nightly., Disp: 30 Tab, Rfl: 6    aspirin 81 mg chewable tablet, Take 1 Tab by mouth daily. , Disp: 90 Tab, Rfl: 3   varenicline (Chantix) 0.5 mg tablet, Take 0.5mg once a day for 3 days followed by 0.5mg twice a day for 4 days, followed by 1mg twice a day, Disp: 11 Tab, Rfl: 0    [START ON 6/15/2020] varenicline (CHANTIX) 1 mg tablet, Take 1 Tab by mouth two (2) times a day for 90 days. , Disp: 180 Tab, Rfl: 0    lisinopriL (PRINIVIL, ZESTRIL) 10 mg tablet, Take 1 Tab by mouth daily. , Disp: 90 Tab, Rfl: 3    amLODIPine (NORVASC) 5 mg tablet, Take 1 Tab by mouth daily. , Disp: 30 Tab, Rfl: 6    meclizine (ANTIVERT) 25 mg tablet, Take 25 mg by mouth three (3) times daily as needed. , Disp: , Rfl:     umeclidinium-vilanterol (ANORO ELLIPTA) 62.5-25 mcg/actuation inhaler, Take 1 Puff by inhalation daily. , Disp: 1 Inhaler, Rfl: 11  Allergies   Allergen Reactions    Atenolol Other (comments)     Pt c/o chest pain and is reluctant to take med due to being told that it could cause \"death\"    Nalbuphine Other (comments)     Extremely nauseous    Seasonale [Levonorgestrel-Ethinyl Estrad] Other (comments)     Not allergic to this medication per patient         Review of Systems   Review of Systems   Constitutional: Negative for chills and fever. HENT: Negative for nosebleeds. Eyes: Negative for blurred vision and double vision. Respiratory: Negative for cough, hemoptysis, sputum production, shortness of breath and wheezing. Cardiovascular: Negative for chest pain, palpitations, orthopnea, claudication, leg swelling and PND. Gastrointestinal: Negative for abdominal pain, heartburn, nausea and vomiting. Musculoskeletal: Negative for myalgias. Skin: Negative for rash. Neurological: Negative for dizziness, weakness and headaches. Endo/Heme/Allergies: Does not bruise/bleed easily. PHYSICAL EXAMINATION:    Vital Signs: (As obtained by patient/caregiver at home)  There were no vitals taken for this visit.      Constitutional: [x] Appears well-developed and well-nourished [x] No apparent distress      Mental status: [x] Alert and awake  [x] Oriented to person/place/time [x] Able to follow commands        Eyes:   EOM    [x]  Normal    [] Abnormal -   Sclera  [x]  Normal    [] Abnormal -          Discharge [x]  None visible      HENT: [x] Normocephalic, atraumatic  [] Abnormal -  [x] Mouth/Throat: Mucous membranes are moist    External Ears [x] Normal  [] Abnormal -    Neck: [x] No visualized mass,NO JVD [] Abnormal -     Pulmonary/Chest: [x] Respiratory effort normal   [x] No visualized signs of difficulty breathing or respiratory distress        [] Abnormal -     Musculoskeletal:   [x] Normal gait with no signs of ataxia         [x] Normal range of motion of neck        [] Abnormal -    Neurological:        [x] No Facial Asymmetry (Cranial nerve 7 motor function) (limited exam due to video visit)          [x] No gaze palsy        [] Abnormal -        Skin:        [x] No significant exanthematous lesions ,no bruising       [] Abnormal-           Psychiatric:       [x] Normal Affect [] Abnormal -       [x] No Hallucinations  Extremities:           No Edema    Other pertinent observable physical exam findings:-    Pertinent LAB and reports  I have reviewed available  pertinent notes, labs and reports and included in current evaluation of this patient. Assessment & Plan:   Diagnoses and all orders for this visit:    1. Coronary artery disease of native artery of native heart with stable angina pectoris ([address]    2. Pulmonary emphysema, unspecified emphysema type ([address]    3. Essential hypertension    4. Smoker    5. Dyslipidemia    6. Carotid bruit, unspecified laterality        Patient not very compliant with medication. Advised to take at least 81 mg of aspirin, 5 mg of lisinopril and Lipitor at night. Recommend follow-up in 3 months to evaluate blood pressure. No orders of the defined types were placed in this encounter. We discussed the expected course, resolution and complications of the diagnosis(es) in detail. Medication risks, benefits, costs, interactions, and alternatives were discussed as indicated. I advised him to contact the office if his condition worsens, changes or fails to improve as anticipated. He expressed understanding with the diagnosis(es) and plan. I was in the office while conducting this encounter. Pursuant to the emergency declaration under the [address] [place] [address] waiver authority and the [employer] Act, this Virtual  Visit was conducted, with patient's consent, to reduce the patient's risk of exposure to COVID-19 and provide continuity of care for an established patient. Services were provided through a video synchronous discussion virtually to substitute for in-person clinic visit.     Cesilia Enciso MD

## 2020-07-31 ENCOUNTER — TELEPHONE (OUTPATIENT)
Dept: CARDIOLOGY CLINIC | Age: 60
End: 2020-07-31

## 2020-07-31 NOTE — TELEPHONE ENCOUNTER
Patient states when he is walking or picking up something he feels \"a pulsating lump in his throat\" and he gets very dizzy. States it been happening on and of for a couple months and worsening x2 days. Last night   HR: 110  BP: 148/98    Please advise.

## 2020-08-11 ENCOUNTER — VIRTUAL VISIT (OUTPATIENT)
Dept: CARDIOLOGY CLINIC | Age: 60
End: 2020-08-11

## 2020-08-11 DIAGNOSIS — J43.9 PULMONARY EMPHYSEMA, UNSPECIFIED EMPHYSEMA TYPE (HCC): ICD-10-CM

## 2020-08-11 DIAGNOSIS — F17.200 SMOKER: ICD-10-CM

## 2020-08-11 DIAGNOSIS — E78.5 DYSLIPIDEMIA: ICD-10-CM

## 2020-08-11 DIAGNOSIS — I10 ESSENTIAL HYPERTENSION: ICD-10-CM

## 2020-08-11 DIAGNOSIS — I25.118 CORONARY ARTERY DISEASE OF NATIVE ARTERY OF NATIVE HEART WITH STABLE ANGINA PECTORIS (HCC): Primary | ICD-10-CM

## 2020-08-11 NOTE — PROGRESS NOTES
1. Have you been to the ER, urgent care clinic since your last visit? Hospitalized since your last visit?     no    2. Have you seen or consulted any other health care providers outside of the [address] [place] [address] since your last visit? Include any pap smears or colon screening.       Yes When: x 1 week ago with PCP

## 2020-08-16 NOTE — PROGRESS NOTES
Lyric Young is a 63 y.o. male who was seen by synchronous (real-time) audio-video technology on 8/11/2020. Consent:  He and/or his healthcare decision maker is aware that this patient-initiated Telehealth encounter is a billable service, with coverage as determined by his insurance carrier. He is aware that he may receive a bill and has provided verbal consent to proceed: Yes    712  Subjective:   Lyric Young was seen for Hypertension (sooner for tachycardia and dizziness )    Patient is a 60-year-old male with history of CAD status post PCI admitted to emergency room with orthostatic hypotension and dizziness. Lisinopril was discontinued. Seen for follow-up. Denies any further episodes of dizziness. No syncope. Has mild stable dyspnea prior likely from emphysema. No orthopnea or PND. No lower extremity swelling. Family History   Problem Relation Age of Onset    No Known Problems Mother     Heart Attack Father     Stroke Brother      Past Surgical History:   Procedure Laterality Date    HX HEART CATHETERIZATION  09/2018    HX HERNIA REPAIR       Allergies   Allergen Reactions    Atenolol Other (comments)     Pt c/o chest pain and is reluctant to take med due to being told that it could cause \"death\"    Nalbuphine Other (comments)     Extremely nauseous    Seasonale [Levonorgestrel-Ethinyl Estrad] Other (comments)     Not allergic to this medication per patient       Current Outpatient Medications:     albuterol (PROVENTIL HFA, VENTOLIN HFA, PROAIR HFA) 90 mcg/actuation inhaler, Take 1-2 Puffs by inhalation every four (4) hours as needed for Wheezing or Shortness of Breath., Disp: 3 Inhaler, Rfl: 4    aspirin 81 mg chewable tablet, Take 1 Tab by mouth daily. , Disp: 90 Tab, Rfl: 3    umeclidinium-vilanterol (ANORO ELLIPTA) 62.5-25 mcg/actuation inhaler, Take 1 Puff by inhalation daily. , Disp: 1 Inhaler, Rfl: 11    varenicline (Chantix) 0.5 mg tablet, Take 0.5mg once a day for 3 days followed by 0.5mg twice a day for 4 days, followed by 1mg twice a day, Disp: 11 Tab, Rfl: 0    varenicline (CHANTIX) 1 mg tablet, Take 1 Tab by mouth two (2) times a day for 90 days. , Disp: 180 Tab, Rfl: 0    clopidogreL (PLAVIX) 75 mg tab, Take 1 Tab by mouth daily. , Disp: 90 Tab, Rfl: 3    lisinopriL (PRINIVIL, ZESTRIL) 10 mg tablet, Take 1 Tab by mouth daily. , Disp: 90 Tab, Rfl: 3    simvastatin (ZOCOR) 40 mg tablet, Take 1 Tab by mouth nightly., Disp: 30 Tab, Rfl: 6    amLODIPine (NORVASC) 5 mg tablet, Take 1 Tab by mouth daily. , Disp: 30 Tab, Rfl: 6    meclizine (ANTIVERT) 25 mg tablet, Take 25 mg by mouth three (3) times daily as needed. , Disp: , Rfl:   Allergies   Allergen Reactions    Atenolol Other (comments)     Pt c/o chest pain and is reluctant to take med due to being told that it could cause \"death\"    Nalbuphine Other (comments)     Extremely nauseous    Seasonale [Levonorgestrel-Ethinyl Estrad] Other (comments)     Not allergic to this medication per patient         Review of Systems   Review of Systems   Constitutional: Negative for chills and fever. HENT: Negative for nosebleeds. Eyes: Negative for blurred vision and double vision. Respiratory: Negative for cough, hemoptysis, sputum production, shortness of breath and wheezing. Cardiovascular: Negative for chest pain, palpitations, orthopnea, claudication, leg swelling and PND. Gastrointestinal: Negative for abdominal pain, heartburn, nausea and vomiting. Musculoskeletal: Negative for myalgias. Skin: Negative for rash. Neurological: Negative for dizziness, weakness and headaches. Endo/Heme/Allergies: Does not bruise/bleed easily. PHYSICAL EXAMINATION:    Vital Signs: (As obtained by patient/caregiver at home)  There were no vitals taken for this visit.      Constitutional: [x] Appears well-developed and well-nourished [x] No apparent distress      Mental status: [x] Alert and awake  [x] Oriented to person/place/time [x] Able to follow commands        Eyes:   EOM    [x]  Normal    [] Abnormal -   Sclera  [x]  Normal    [] Abnormal -          Discharge [x]  None visible      HENT: [x] Normocephalic, atraumatic  [] Abnormal -  [x] Mouth/Throat: Mucous membranes are moist    External Ears [x] Normal  [] Abnormal -    Neck: [x] No visualized mass,NO JVD [] Abnormal -     Pulmonary/Chest: [x] Respiratory effort normal   [x] No visualized signs of difficulty breathing or respiratory distress        [] Abnormal -     Musculoskeletal:   [x] Normal gait with no signs of ataxia         [x] Normal range of motion of neck        [] Abnormal -    Neurological:        [x] No Facial Asymmetry (Cranial nerve 7 motor function) (limited exam due to video visit)          [x] No gaze palsy        [] Abnormal -        Skin:        [x] No significant exanthematous lesions ,no bruising       [] Abnormal-           Psychiatric:       [x] Normal Affect [] Abnormal -       [x] No Hallucinations  Extremities:           No Edema    Other pertinent observable physical exam findings:-    Pertinent LAB and reports  I have reviewed available  pertinent notes, labs and reports and included in current evaluation of this patient. Assessment & Plan:   Diagnoses and all orders for this visit:    1. Coronary artery disease of native artery of native heart with stable angina pectoris ([address]    2. Pulmonary emphysema, unspecified emphysema type ([address]    3. Essential hypertension    4. Smoker    5. Dyslipidemia        Patient not very compliant with medication. On aspirin 81 mg daily. Not very compliant with meds. Recommend follow-up in 6 months to evaluate blood pressure. Also recommended smoking cessation. No orders of the defined types were placed in this encounter. Follow-up and Dispositions    · Return in about 6 months (around 2/11/2021).         We discussed the expected course, resolution and complications of the diagnosis(es) in detail. Medication risks, benefits, costs, interactions, and alternatives were discussed as indicated. I advised him to contact the office if his condition worsens, changes or fails to improve as anticipated. He expressed understanding with the diagnosis(es) and plan. I was in the office while conducting this encounter. Pursuant to the emergency declaration under the [address] [place] [address] [facility] authority and the [employer] Act, this Virtual  Visit was conducted, with patient's consent, to reduce the patient's risk of exposure to COVID-19 and provide continuity of care for an established patient. Services were provided through a video synchronous discussion virtually to substitute for in-person clinic visit.     Alexandre Odell MD

## 2020-08-19 ENCOUNTER — HOME HEALTH ADMISSION (OUTPATIENT)
Dept: HOME HEALTH SERVICES | Facility: HOME HEALTH | Age: 60
End: 2020-08-19
Payer: MEDICAID

## 2020-08-23 ENCOUNTER — HOME CARE VISIT (OUTPATIENT)
Dept: SCHEDULING | Facility: HOME HEALTH | Age: 60
End: 2020-08-23
Payer: MEDICAID

## 2020-08-26 ENCOUNTER — HOME CARE VISIT (OUTPATIENT)
Dept: SCHEDULING | Facility: HOME HEALTH | Age: 60
End: 2020-08-26
Payer: MEDICAID

## 2020-08-26 PROCEDURE — 400013 HH SOC

## 2020-08-26 PROCEDURE — G0299 HHS/HOSPICE OF RN EA 15 MIN: HCPCS

## 2020-08-27 ENCOUNTER — HOME CARE VISIT (OUTPATIENT)
Dept: HOME HEALTH SERVICES | Facility: HOME HEALTH | Age: 60
End: 2020-08-27
Payer: MEDICAID

## 2020-08-28 ENCOUNTER — HOME CARE VISIT (OUTPATIENT)
Dept: HOME HEALTH SERVICES | Facility: HOME HEALTH | Age: 60
End: 2020-08-28
Payer: MEDICAID

## 2020-08-28 VITALS
HEART RATE: 100 BPM | RESPIRATION RATE: 18 BRPM | SYSTOLIC BLOOD PRESSURE: 136 MMHG | DIASTOLIC BLOOD PRESSURE: 88 MMHG | OXYGEN SATURATION: 96 % | TEMPERATURE: 97.6 F

## 2020-08-29 ENCOUNTER — HOME CARE VISIT (OUTPATIENT)
Dept: SCHEDULING | Facility: HOME HEALTH | Age: 60
End: 2020-08-29
Payer: MEDICAID

## 2020-08-29 PROCEDURE — G0299 HHS/HOSPICE OF RN EA 15 MIN: HCPCS

## 2020-08-30 VITALS
TEMPERATURE: 98 F | RESPIRATION RATE: 22 BRPM | OXYGEN SATURATION: 98 % | SYSTOLIC BLOOD PRESSURE: 150 MMHG | HEART RATE: 110 BPM | DIASTOLIC BLOOD PRESSURE: 98 MMHG

## 2020-09-01 ENCOUNTER — HOME CARE VISIT (OUTPATIENT)
Dept: SCHEDULING | Facility: HOME HEALTH | Age: 60
End: 2020-09-01
Payer: MEDICAID

## 2020-09-04 ENCOUNTER — HOME CARE VISIT (OUTPATIENT)
Dept: SCHEDULING | Facility: HOME HEALTH | Age: 60
End: 2020-09-04
Payer: MEDICAID

## 2020-09-10 ENCOUNTER — HOME CARE VISIT (OUTPATIENT)
Dept: SCHEDULING | Facility: HOME HEALTH | Age: 60
End: 2020-09-10
Payer: MEDICAID

## 2020-09-10 PROCEDURE — G0300 HHS/HOSPICE OF LPN EA 15 MIN: HCPCS

## 2020-09-13 VITALS
SYSTOLIC BLOOD PRESSURE: 148 MMHG | DIASTOLIC BLOOD PRESSURE: 98 MMHG | OXYGEN SATURATION: 93 % | TEMPERATURE: 97.6 F | HEART RATE: 101 BPM

## 2020-09-16 ENCOUNTER — OFFICE VISIT (OUTPATIENT)
Dept: CARDIOLOGY CLINIC | Age: 60
End: 2020-09-16

## 2020-09-16 VITALS
SYSTOLIC BLOOD PRESSURE: 137 MMHG | HEART RATE: 113 BPM | TEMPERATURE: 98.1 F | DIASTOLIC BLOOD PRESSURE: 91 MMHG | BODY MASS INDEX: 26.53 KG/M2 | WEIGHT: 169 LBS | HEIGHT: 67 IN

## 2020-09-16 DIAGNOSIS — F17.200 SMOKER: ICD-10-CM

## 2020-09-16 DIAGNOSIS — I10 ESSENTIAL HYPERTENSION: ICD-10-CM

## 2020-09-16 DIAGNOSIS — E78.5 DYSLIPIDEMIA: ICD-10-CM

## 2020-09-16 DIAGNOSIS — I25.118 CORONARY ARTERY DISEASE OF NATIVE ARTERY OF NATIVE HEART WITH STABLE ANGINA PECTORIS (HCC): Primary | ICD-10-CM

## 2020-09-16 DIAGNOSIS — F10.10 ETOH ABUSE: ICD-10-CM

## 2020-09-16 DIAGNOSIS — J43.9 PULMONARY EMPHYSEMA, UNSPECIFIED EMPHYSEMA TYPE (HCC): ICD-10-CM

## 2020-09-16 RX ORDER — LISINOPRIL 5 MG/1
5 TABLET ORAL DAILY
Qty: 30 TAB | Refills: 6 | Status: SHIPPED | OUTPATIENT
Start: 2020-09-16 | End: 2022-04-08 | Stop reason: ALTCHOICE

## 2020-09-16 RX ORDER — LISINOPRIL 5 MG/1
TABLET ORAL DAILY
COMMUNITY
End: 2020-09-16 | Stop reason: SDUPTHER

## 2020-09-16 NOTE — PROGRESS NOTES
HISTORY OF PRESENT ILLNESS  Lucy Nelson is a 62 y.o. male. Shortness of Breath   The history is provided by the patient. This is a chronic problem. The problem occurs intermittently. The problem has not changed since onset. Pertinent negatives include no ear pain, no neck pain, no wheezing, no rash and no leg swelling. Associated medical issues do not include CAD or heart failure. Dizziness   The history is provided by the patient. This is a chronic problem. The problem occurs daily. Associated symptoms include shortness of breath. Review of Systems   Constitutional: Negative for chills and weight loss. HENT: Negative for congestion, ear discharge, ear pain, hearing loss, nosebleeds and tinnitus. Eyes: Negative for blurred vision. Respiratory: Positive for shortness of breath. Negative for wheezing and stridor. Cardiovascular: Negative for leg swelling. Gastrointestinal: Negative for heartburn. Musculoskeletal: Negative for myalgias and neck pain. Skin: Negative for itching and rash. Neurological: Positive for dizziness. Negative for tingling, tremors, focal weakness and loss of consciousness. Psychiatric/Behavioral: Negative for depression and suicidal ideas. Family History   Problem Relation Age of Onset    No Known Problems Mother     Heart Attack Father     Stroke Brother        Past Medical History:   Diagnosis Date    Chronic lung disease        Past Surgical History:   Procedure Laterality Date    HX HEART CATHETERIZATION  09/2018    HX HERNIA REPAIR         Social History     Tobacco Use    Smoking status: Current Every Day Smoker     Packs/day: 1.00     Years: 46.00     Pack years: 46.00     Start date: 7/11/1973    Smokeless tobacco: Never Used   Substance Use Topics    Alcohol use:  Yes       Allergies   Allergen Reactions    Atenolol Other (comments)     Pt c/o chest pain and is reluctant to take med due to being told that it could cause \"death\"    Nalbuphine Other (comments)     Extremely nauseous    Seasonale [Levonorgestrel-Ethinyl Estrad] Other (comments)     Not allergic to this medication per patient       Outpatient Medications Marked as Taking for the 9/16/20 encounter (Office Visit) with Moreno Sam MD   Medication Sig Dispense Refill    lisinopriL (PRINIVIL, ZESTRIL) 5 mg tablet Take 1 Tab by mouth daily. 30 Tab 6    albuterol (PROVENTIL HFA, VENTOLIN HFA, PROAIR HFA) 90 mcg/actuation inhaler Take 1-2 Puffs by inhalation every four (4) hours as needed for Wheezing or Shortness of Breath. 3 Inhaler 4    varenicline (Chantix) 0.5 mg tablet Take 0.5mg once a day for 3 days followed by 0.5mg twice a day for 4 days, followed by 1mg twice a day 11 Tab 0    meclizine (ANTIVERT) 25 mg tablet Take 25 mg by mouth three (3) times daily as needed.  aspirin 81 mg chewable tablet Take 1 Tab by mouth daily. 90 Tab 3        Visit Vitals  BP (!) 137/91 (BP 1 Location: Left arm, BP Patient Position: Standing)   Pulse (!) 113   Temp 98.1 °F (36.7 °C) (Temporal)   Ht 5' 7\" (1.702 m)   Wt 76.7 kg (169 lb)   BMI 26.47 kg/m²     Physical Exam   Constitutional: He is oriented to person, place, and time. He appears well-developed and well-nourished. No distress. HENT:   Head: Atraumatic. Mouth/Throat: No oropharyngeal exudate. Eyes: Conjunctivae are normal. Right eye exhibits no discharge. Left eye exhibits no discharge. No scleral icterus. Neck: Normal range of motion. Neck supple. No JVD present. No tracheal deviation present. No thyromegaly present. Cardiovascular: Normal rate and regular rhythm. Exam reveals no gallop. No murmur heard. Pulses:       Carotid pulses are on the right side with bruit. Pulmonary/Chest: Effort normal and breath sounds normal. No stridor. He has no wheezes. He has no rales. Abdominal: Soft. There is no abdominal tenderness. There is no rebound and no guarding. Musculoskeletal: Normal range of motion. General: No tenderness or edema. Lymphadenopathy:     He has no cervical adenopathy. Neurological: He is alert and oriented to person, place, and time. He exhibits normal muscle tone. Skin: Skin is warm. He is not diaphoretic. Psychiatric: He has a normal mood and affect. His behavior is normal.     ekg sinus rhythm with no acute st-t changes  Echo and lexiscan from 2012 reviewed  09/21/18   CARDIAC PROCEDURE 09/21/2018 9/21/2018    Narrative Single vessel coronary artery disease. Preserved LV function. S/p ptca/stent to OM1. Continue DAPT     Signed by: Boyd Woodruff MD     ASSESSMENT and PLAN    ICD-10-CM ICD-9-CM    1. Coronary artery disease of native artery of native heart with stable angina pectoris ([facility]  I25.118 414.01      413.9    2. Pulmonary emphysema, unspecified emphysema type ([facility]  J43.9 492.8    3. Essential hypertension  I10 401.9    4. Smoker  F17.200 305.1    5. Dyslipidemia  E78.5 272.4    6. ETOH abuse  F10.10 305.00      Orders Placed This Encounter    lisinopriL (PRINIVIL, ZESTRIL) 5 mg tablet     Sig: Take 1 Tab by mouth daily. Dispense:  30 Tab     Refill:  6     Follow-up and Dispositions    · Return in about 3 months (around 12/16/2020). very strongly urged to quit smoking to reduce cardiovascular risk  cardiovascular risk and specific lipid/LDL goals reviewed  use of aspirin to prevent MI and TIA's discussed. Patient with long standing h/o smoking and family h/o premature CAD seen for chest pain- tightness radiating to left jaw associated with mild sob. Patient underwent cardiac cath subsequent PCI to obtuse marginal 1 artery. Recent carotid study -revealed less than 50% internal carotid artery stenosis. Seen for dizziness-- no orthostatic hypotension-- however had tachycardia. Likely from excessive ETOH use. Advised to continue lisinopril and stay well hydrated. Advised to quit using ETOH. Meanwhile continue aspirin.  Patient not taking plavix and statin inspite of advising at every appt.

## 2020-09-16 NOTE — PATIENT INSTRUCTIONS
[employer] Activation    Thank you for requesting access to [employer]. Please follow the instructions below to securely access and download your online medical record. [employer] allows you to send messages to your doctor, view your test results, renew your prescriptions, schedule appointments, and more. How Do I Sign Up? 1. In your internet browser, go to [email or web address]. 2. Click on the First Time User? Click Here link in the Sign In box. You will see the New Member Sign Up page. 3. Enter your [employer] Access Code exactly as it appears below. You will not need to use this code after youve completed the sign-up process. If you do not sign up before the expiration date, you must request a new code. [employer] Access Code: [email or web address]  Expires: 10/31/2020 12:15 PM (This is the date your [employer] access code will )    4. Enter the last four digits of your Social Security Number (xxxx) and Date of Birth (mm/dd/yyyy) as indicated and click Submit. You will be taken to the next sign-up page. 5. Create a [employer] ID. This will be your [employer] login ID and cannot be changed, so think of one that is secure and easy to remember. 6. Create a [employer] password. You can change your password at any time. 7. Enter your Password Reset Question and Answer. This can be used at a later time if you forget your password. 8. Enter your e-mail address. You will receive e-mail notification when new information is available in [address] 9. Click Sign Up. You can now view and download portions of your medical record. 10. Click the Download Summary menu link to download a portable copy of your medical information. Additional Information    If you have questions, please visit the Frequently Asked Questions section of the [employer] website at [email or web address] Remember, [employer] is NOT to be used for urgent needs. For medical emergencies, dial 911.

## 2020-09-18 ENCOUNTER — HOME CARE VISIT (OUTPATIENT)
Dept: SCHEDULING | Facility: HOME HEALTH | Age: 60
End: 2020-09-18
Payer: MEDICAID

## 2020-09-24 ENCOUNTER — HOME CARE VISIT (OUTPATIENT)
Dept: SCHEDULING | Facility: HOME HEALTH | Age: 60
End: 2020-09-24
Payer: MEDICAID

## 2020-09-30 ENCOUNTER — HOME CARE VISIT (OUTPATIENT)
Dept: SCHEDULING | Facility: HOME HEALTH | Age: 60
End: 2020-09-30
Payer: MEDICAID

## 2020-09-30 VITALS
DIASTOLIC BLOOD PRESSURE: 70 MMHG | HEART RATE: 102 BPM | RESPIRATION RATE: 16 BRPM | SYSTOLIC BLOOD PRESSURE: 110 MMHG | TEMPERATURE: 97.4 F | OXYGEN SATURATION: 96 %

## 2020-09-30 PROCEDURE — 400013 HH SOC

## 2020-09-30 PROCEDURE — G0299 HHS/HOSPICE OF RN EA 15 MIN: HCPCS

## 2021-03-30 ENCOUNTER — NURSE NAVIGATOR (OUTPATIENT)
Dept: OTHER | Age: 61
End: 2021-03-30

## 2021-03-30 NOTE — PROGRESS NOTES
Referring Provider: Fuad Burroughs MD      Lung Cancer Risk Profile:   Age: 62  Gender: Male  Height: 67\"  Weight: 169#    Smoking History:  Smoking Status: current use  # years smokin  # years quit: 0  Packs/day: 1.5  Pack years: 70.5    Patient discussed smoking cessation with PCP: Yes, per provider note    Patient participated in shared decision making process with PCP: Unknown    Patient is currently experiencing symptoms: No    If yes what symptoms:     Co-Morbidities:  COPD, CAD    Cancer History:      Additional Risk Factors:    Exposure to second hand smoke, occupational exposure to asbestos and diesel fumes      Patient's smoking history verified via EMR and provider note. Patient meets LDCT lung cancer screening criteria.        DALIA ManeN, RN, OCN  Lung Health Nurse Navigator

## 2021-03-31 ENCOUNTER — HOSPITAL ENCOUNTER (OUTPATIENT)
Dept: CT IMAGING | Age: 61
Discharge: HOME | End: 2021-03-31
Attending: INTERNAL MEDICINE
Payer: MEDICAID

## 2021-03-31 DIAGNOSIS — Z87.891 PERSONAL HISTORY OF TOBACCO USE, PRESENTING HAZARDS TO HEALTH: ICD-10-CM

## 2021-03-31 DIAGNOSIS — J44.9 COPD, GROUP D, BY GOLD 2017 CLASSIFICATION (HCC): ICD-10-CM

## 2021-03-31 DIAGNOSIS — F17.210 CIGARETTE NICOTINE DEPENDENCE WITHOUT COMPLICATION: ICD-10-CM

## 2021-03-31 PROCEDURE — 71271 CT THORAX LUNG CANCER SCR C-: CPT

## 2021-04-06 ENCOUNTER — TELEPHONE (OUTPATIENT)
Dept: PULMONOLOGY | Age: 61
End: 2021-04-06

## 2021-04-06 NOTE — TELEPHONE ENCOUNTER
Pt [phone]). Wants to know the results of CT done 3/31/21 @[facility]. Please check and call him back.

## 2021-05-25 PROBLEM — Z65.8 PSYCHOSOCIAL STRESSORS: Status: ACTIVE | Noted: 2018-12-14

## 2021-05-25 PROBLEM — Z63.4 BEREAVEMENT: Status: ACTIVE | Noted: 2018-12-14

## 2021-05-25 PROBLEM — F10.10 ALCOHOL USE DISORDER, MILD, ABUSE: Status: ACTIVE | Noted: 2018-12-14

## 2021-05-25 PROBLEM — F32.A DEPRESSION: Status: ACTIVE | Noted: 2018-12-14

## 2021-05-25 PROBLEM — K43.9 VENTRAL HERNIA WITHOUT OBSTRUCTION OR GANGRENE: Status: ACTIVE | Noted: 2018-12-15

## 2021-07-21 ENCOUNTER — OFFICE VISIT (OUTPATIENT)
Dept: CARDIOLOGY CLINIC | Age: 61
End: 2021-07-21
Payer: MEDICAID

## 2021-07-21 VITALS
WEIGHT: 159 LBS | BODY MASS INDEX: 24.96 KG/M2 | DIASTOLIC BLOOD PRESSURE: 78 MMHG | HEIGHT: 67 IN | SYSTOLIC BLOOD PRESSURE: 109 MMHG | HEART RATE: 115 BPM

## 2021-07-21 DIAGNOSIS — F17.200 SMOKER: ICD-10-CM

## 2021-07-21 DIAGNOSIS — I25.118 CORONARY ARTERY DISEASE OF NATIVE ARTERY OF NATIVE HEART WITH STABLE ANGINA PECTORIS (HCC): Primary | ICD-10-CM

## 2021-07-21 DIAGNOSIS — E78.5 DYSLIPIDEMIA: ICD-10-CM

## 2021-07-21 DIAGNOSIS — I10 ESSENTIAL HYPERTENSION: ICD-10-CM

## 2021-07-21 DIAGNOSIS — J43.9 PULMONARY EMPHYSEMA, UNSPECIFIED EMPHYSEMA TYPE (HCC): ICD-10-CM

## 2021-07-21 PROCEDURE — 93000 ELECTROCARDIOGRAM COMPLETE: CPT | Performed by: INTERNAL MEDICINE

## 2021-07-21 PROCEDURE — 99214 OFFICE O/P EST MOD 30 MIN: CPT | Performed by: INTERNAL MEDICINE

## 2021-07-21 RX ORDER — MECLIZINE HCL 12.5 MG 12.5 MG/1
12.5 TABLET ORAL
Qty: 30 TABLET | Refills: 1 | Status: SHIPPED | OUTPATIENT
Start: 2021-07-21 | End: 2021-12-29

## 2021-07-21 RX ORDER — ACETAMINOPHEN 500 MG
TABLET ORAL 2 TIMES DAILY
COMMUNITY
End: 2022-04-08

## 2021-07-21 NOTE — PROGRESS NOTES
1. Have you been to the ER, urgent care clinic since your last visit? Hospitalized since your last visit? Yes  [facility] for Duplex Carotid     2. Have you seen or consulted any other health care providers outside of the [address] [place] [address] since your last visit? Include any pap smears or colon screening. yes pcp    3. Since your last visit, have you had any of the following symptoms? Sob. dizziness         4. Have you had any blood work, X-rays or cardiac testing?       yes PCP blood work

## 2021-07-21 NOTE — PROGRESS NOTES
HISTORY OF PRESENT ILLNESS  Marion Carpenter is a 62 y.o. male. Dizziness  The history is provided by the patient. This is a chronic problem. The problem occurs daily. Associated symptoms include shortness of breath. Shortness of Breath  The history is provided by the patient. This is a chronic problem. The problem occurs intermittently. The problem has not changed since onset. Pertinent negatives include no ear pain, no neck pain, no wheezing, no rash and no leg swelling. Associated medical issues do not include CAD or heart failure. Review of Systems   Constitutional: Negative for chills and weight loss. HENT: Negative for congestion, ear discharge, ear pain, hearing loss, nosebleeds and tinnitus. Eyes: Negative for blurred vision. Respiratory: Positive for shortness of breath. Negative for wheezing and stridor. Cardiovascular: Negative for leg swelling. Gastrointestinal: Negative for heartburn. Musculoskeletal: Negative for myalgias and neck pain. Skin: Negative for itching and rash. Neurological: Positive for dizziness. Negative for tingling, tremors, focal weakness and loss of consciousness. Psychiatric/Behavioral: Negative for depression and suicidal ideas.      Family History   Problem Relation Age of Onset    No Known Problems Mother     Heart Attack Father     Stroke Brother        Past Medical History:   Diagnosis Date    Chronic lung disease        Past Surgical History:   Procedure Laterality Date    HX HEART CATHETERIZATION  09/2018    HX HERNIA REPAIR         Social History     Tobacco Use    Smoking status: Current Every Day Smoker     Packs/day: 1.00     Years: 46.00     Pack years: 46.00     Start date: 7/11/1973    Smokeless tobacco: Never Used    Tobacco comment: pack and 1/2 cigarettes a day   Substance Use Topics    Alcohol use: Yes     Comment: 3-4 drinks a week       Allergies   Allergen Reactions    Atenolol Other (comments)     Pt c/o chest pain and is reluctant to take med due to being told that it could cause \"death\"    Nalbuphine Other (comments)     Extremely nauseous    Seasonale [Levonorgestrel-Ethinyl Estrad] Other (comments)     Not allergic to this medication per patient       Outpatient Medications Marked as Taking for the 7/21/21 encounter (Office Visit) with Wendi Palacios MD   Medication Sig Dispense Refill    cholecalciferol (VITAMIN D3) (2,000 UNITS /50 MCG) cap capsule Take  by mouth two (2) times a day.  meclizine (ANTIVERT) 12.5 mg tablet Take 1 Tablet by mouth two (2) times daily as needed for Dizziness for up to 10 days. 30 Tablet 1    lisinopriL (PRINIVIL, ZESTRIL) 5 mg tablet Take 1 Tab by mouth daily. 30 Tab 6    albuterol (PROVENTIL HFA, VENTOLIN HFA, PROAIR HFA) 90 mcg/actuation inhaler Take 1-2 Puffs by inhalation every four (4) hours as needed for Wheezing or Shortness of Breath. 3 Inhaler 4    lisinopriL (PRINIVIL, ZESTRIL) 10 mg tablet Take 1 Tab by mouth daily. 90 Tab 3        Visit Vitals  /78 (BP 1 Location: Left upper arm, BP Patient Position: Standing)   Pulse (!) 115   Ht 5' 7\" (1.702 m)   Wt 72.1 kg (159 lb)   BMI 24.90 kg/m²     Physical Exam  Constitutional:       General: He is not in acute distress. Appearance: He is well-developed. He is not diaphoretic. HENT:      Head: Atraumatic. Mouth/Throat:      Pharynx: No oropharyngeal exudate. Eyes:      General: No scleral icterus. Right eye: No discharge. Left eye: No discharge. Conjunctiva/sclera: Conjunctivae normal.   Neck:      Thyroid: No thyromegaly. Vascular: No JVD. Trachea: No tracheal deviation. Cardiovascular:      Rate and Rhythm: Normal rate and regular rhythm. Pulses:           Carotid pulses are on the right side with bruit. Heart sounds: No murmur heard. No gallop. Pulmonary:      Effort: Pulmonary effort is normal.      Breath sounds: Normal breath sounds. No stridor.  No wheezing or rales. Abdominal:      Palpations: Abdomen is soft. Tenderness: There is no abdominal tenderness. There is no guarding or rebound. Musculoskeletal:         General: No tenderness. Normal range of motion. Cervical back: Normal range of motion and neck supple. Lymphadenopathy:      Cervical: No cervical adenopathy. Skin:     General: Skin is warm. Neurological:      Mental Status: He is alert and oriented to person, place, and time. Motor: No abnormal muscle tone. Psychiatric:         Behavior: Behavior normal.       ekg sinus rhythm with no acute st-t changes  Echo and lexiscan from 2012 reviewed  09/21/18   CARDIAC PROCEDURE 09/21/2018 9/21/2018    Narrative Single vessel coronary artery disease. Preserved LV function. S/p ptca/stent to OM1. Continue DAPT     Signed by: Marlene Dubose MD     ASSESSMENT and PLAN    ICD-10-CM ICD-9-CM    1. Coronary artery disease of native artery of native heart with stable angina pectoris ([facility]  I25.118 414.01      413.9    2. Essential hypertension  I10 401.9 AMB POC EKG ROUTINE W/ 12 LEADS, INTER & REP   3. Pulmonary emphysema, unspecified emphysema type ([facility]  J43.9 492.8    4. Smoker  F17.200 305.1    5. Dyslipidemia  E78.5 272.4      Orders Placed This Encounter    AMB POC EKG ROUTINE W/ 12 LEADS, INTER & REP     Order Specific Question:   Reason for Exam:     Answer:   htn    meclizine (ANTIVERT) 12.5 mg tablet     Sig: Take 1 Tablet by mouth two (2) times daily as needed for Dizziness for up to 10 days. Dispense:  30 Tablet     Refill:  1     Follow-up and Dispositions    · Return in about 1 month (around 8/21/2021). very strongly urged to quit smoking to reduce cardiovascular risk  cardiovascular risk and specific lipid/LDL goals reviewed  use of aspirin to prevent MI and TIA's discussed.     Patient with long standing h/o smoking and family h/o premature CAD seen for chest pain- tightness radiating to left jaw associated with mild sob. Patient underwent cardiac cath subsequent PCI to obtuse marginal 1 artery. Recent carotid study -revealed less than 50% internal carotid artery stenosis. Seen for dizziness-- no orthostatic hypotension--likely from benign positional vertigo. Add meclizine 12.5 mg twice daily as needed. Patient very noncompliant with aspirin/statin. Recommend daily use. Discussed at length regarding need for smoking cessation. Follow-up in 1 month.

## 2021-07-22 ENCOUNTER — TELEPHONE (OUTPATIENT)
Dept: CARDIOLOGY CLINIC | Age: 61
End: 2021-07-22

## 2021-07-22 NOTE — TELEPHONE ENCOUNTER
Patient called and stated he saw you on Wednesday and today he had another dizzy spell today while helping brother load  flowers in truck for his wife . Patient states he started to feel funny then he got really dizzy. Patient states what he should do and if he needs to come back and see you again. Patient hasn't picked up medications you prescribed yet.  Please advice

## 2021-07-26 NOTE — TELEPHONE ENCOUNTER
Patient will see you on Wednwsday. He voices understanding and acceptance of this advice and will call back if any further questions or concerns.

## 2021-07-28 ENCOUNTER — OFFICE VISIT (OUTPATIENT)
Dept: CARDIOLOGY CLINIC | Age: 61
End: 2021-07-28
Payer: MEDICAID

## 2021-07-28 VITALS
SYSTOLIC BLOOD PRESSURE: 113 MMHG | HEIGHT: 67 IN | DIASTOLIC BLOOD PRESSURE: 73 MMHG | HEART RATE: 78 BPM | BODY MASS INDEX: 24.96 KG/M2 | WEIGHT: 159 LBS

## 2021-07-28 DIAGNOSIS — I10 ESSENTIAL HYPERTENSION: Primary | ICD-10-CM

## 2021-07-28 DIAGNOSIS — F17.200 SMOKER: ICD-10-CM

## 2021-07-28 DIAGNOSIS — I25.118 CORONARY ARTERY DISEASE OF NATIVE ARTERY OF NATIVE HEART WITH STABLE ANGINA PECTORIS (HCC): Primary | ICD-10-CM

## 2021-07-28 DIAGNOSIS — I25.118 CORONARY ARTERY DISEASE OF NATIVE ARTERY OF NATIVE HEART WITH STABLE ANGINA PECTORIS (HCC): ICD-10-CM

## 2021-07-28 DIAGNOSIS — J43.9 PULMONARY EMPHYSEMA, UNSPECIFIED EMPHYSEMA TYPE (HCC): ICD-10-CM

## 2021-07-28 DIAGNOSIS — E78.5 DYSLIPIDEMIA: ICD-10-CM

## 2021-07-28 PROCEDURE — 99214 OFFICE O/P EST MOD 30 MIN: CPT | Performed by: INTERNAL MEDICINE

## 2021-07-28 RX ORDER — CLOPIDOGREL BISULFATE 75 MG/1
75 TABLET ORAL DAILY
Qty: 90 TABLET | Refills: 3 | Status: SHIPPED | OUTPATIENT
Start: 2021-07-28

## 2021-07-28 NOTE — PROGRESS NOTES
1. Have you been to the ER, urgent care clinic since your last visit? Hospitalized since your last visit?     no  2. Have you seen or consulted any other health care providers outside of the [address] [place] [address] since your last visit? Include any pap smears or colon screening. No     3. Since your last visit, have you had any of the following symptoms? shortness of breath and dizziness.

## 2021-07-28 NOTE — TELEPHONE ENCOUNTER
Requested Prescriptions     Pending Prescriptions Disp Refills    clopidogreL (PLAVIX) 75 mg tab 90 Tablet 3     Sig: Take 1 Tablet by mouth daily.

## 2021-08-04 NOTE — PROGRESS NOTES
HISTORY OF PRESENT ILLNESS  Erich Cruz is a 62 y.o. male. Dizziness  The history is provided by the patient. This is a chronic problem. The problem occurs daily. Associated symptoms include shortness of breath. Shortness of Breath  The history is provided by the patient. This is a chronic problem. The problem occurs intermittently. The problem has not changed since onset. Pertinent negatives include no ear pain, no neck pain, no wheezing, no rash and no leg swelling. Associated medical issues do not include CAD or heart failure. Review of Systems   Constitutional: Negative for chills and weight loss. HENT: Negative for congestion, ear discharge, ear pain, hearing loss, nosebleeds and tinnitus. Eyes: Negative for blurred vision. Respiratory: Positive for shortness of breath. Negative for wheezing and stridor. Cardiovascular: Negative for leg swelling. Gastrointestinal: Negative for heartburn. Musculoskeletal: Negative for myalgias and neck pain. Skin: Negative for itching and rash. Neurological: Positive for dizziness. Negative for tingling, tremors, focal weakness and loss of consciousness. Psychiatric/Behavioral: Negative for depression and suicidal ideas.      Family History   Problem Relation Age of Onset    No Known Problems Mother     Heart Attack Father     Stroke Brother        Past Medical History:   Diagnosis Date    Chronic lung disease        Past Surgical History:   Procedure Laterality Date    HX HEART CATHETERIZATION  09/2018    HX HERNIA REPAIR         Social History     Tobacco Use    Smoking status: Current Every Day Smoker     Packs/day: 1.00     Years: 46.00     Pack years: 46.00     Start date: 7/11/1973    Smokeless tobacco: Never Used    Tobacco comment: pack and 1/2 cigarettes a day   Substance Use Topics    Alcohol use: Yes     Comment: 3-4 drinks a week       Allergies   Allergen Reactions    Atenolol Other (comments)     Pt c/o chest pain and is reluctant to take med due to being told that it could cause \"death\"    Nalbuphine Other (comments)     Extremely nauseous    Seasonale [Levonorgestrel-Ethinyl Estrad] Other (comments)     Not allergic to this medication per patient       Outpatient Medications Marked as Taking for the 7/28/21 encounter (Office Visit) with Janelle Knapp MD   Medication Sig Dispense Refill    cholecalciferol (VITAMIN D3) (2,000 UNITS /50 MCG) cap capsule Take  by mouth two (2) times a day.  lisinopriL (PRINIVIL, ZESTRIL) 5 mg tablet Take 1 Tab by mouth daily. (Patient taking differently: Take 5 mg by mouth as needed.) 30 Tab 6    albuterol (PROVENTIL HFA, VENTOLIN HFA, PROAIR HFA) 90 mcg/actuation inhaler Take 1-2 Puffs by inhalation every four (4) hours as needed for Wheezing or Shortness of Breath. 3 Inhaler 4    aspirin 81 mg chewable tablet Take 1 Tab by mouth daily. 90 Tab 3        Visit Vitals  /73   Pulse 78   Ht 5' 7\" (1.702 m)   Wt 72.1 kg (159 lb)   BMI 24.90 kg/m²     Physical Exam  Constitutional:       General: He is not in acute distress. Appearance: He is well-developed. He is not diaphoretic. HENT:      Head: Atraumatic. Mouth/Throat:      Pharynx: No oropharyngeal exudate. Eyes:      General: No scleral icterus. Right eye: No discharge. Left eye: No discharge. Conjunctiva/sclera: Conjunctivae normal.   Neck:      Thyroid: No thyromegaly. Vascular: No JVD. Trachea: No tracheal deviation. Cardiovascular:      Rate and Rhythm: Normal rate and regular rhythm. Pulses:           Carotid pulses are on the right side with bruit. Heart sounds: No murmur heard. No gallop. Pulmonary:      Effort: Pulmonary effort is normal.      Breath sounds: Normal breath sounds. No stridor. No wheezing or rales. Abdominal:      Palpations: Abdomen is soft. Tenderness: There is no abdominal tenderness. There is no guarding or rebound. Musculoskeletal:         General: No tenderness. Normal range of motion. Cervical back: Normal range of motion and neck supple. Lymphadenopathy:      Cervical: No cervical adenopathy. Skin:     General: Skin is warm. Neurological:      Mental Status: He is alert and oriented to person, place, and time. Motor: No abnormal muscle tone. Psychiatric:         Behavior: Behavior normal.       ekg sinus rhythm with no acute st-t changes  Echo and lexiscan from 2012 reviewed  09/21/18   CARDIAC PROCEDURE 09/21/2018 9/21/2018    Narrative Single vessel coronary artery disease. Preserved LV function. S/p ptca/stent to OM1. Continue DAPT     Signed by: Wendi Palacios MD     ASSESSMENT and PLAN    ICD-10-CM ICD-9-CM    1. Essential hypertension  I10 401.9    2. Coronary artery disease of native artery of native heart with stable angina pectoris ([facility]  I25.118 414.01      413.9    3. Pulmonary emphysema, unspecified emphysema type ([facility]  J43.9 492.8    4. Smoker  F17.200 305.1    5. Dyslipidemia  E78.5 272.4      No orders of the defined types were placed in this encounter. Follow-up and Dispositions    · Return in about 3 weeks (around 8/18/2021). very strongly urged to quit smoking to reduce cardiovascular risk  cardiovascular risk and specific lipid/LDL goals reviewed  use of aspirin to prevent MI and TIA's discussed. Patient with long standing h/o smoking and family h/o premature CAD seen for chest pain- tightness radiating to left jaw associated with mild sob. Patient underwent cardiac cath subsequent PCI to obtuse marginal 1 artery. Recent carotid study -revealed less than 50% internal carotid artery stenosis. Seen for follow-up. Complains of dizziness and exertional dyspnea. Discussed at length regarding need for smoking cessation and medication compliance. Patient advised to resume aspirin/statin.   Start meclizine 12.5 mg twice daily and follow-up in 3 weeks to reassess symptoms.

## 2021-08-25 ENCOUNTER — OFFICE VISIT (OUTPATIENT)
Dept: CARDIOLOGY CLINIC | Age: 61
End: 2021-08-25
Payer: MEDICAID

## 2021-08-25 VITALS
WEIGHT: 160 LBS | SYSTOLIC BLOOD PRESSURE: 122 MMHG | DIASTOLIC BLOOD PRESSURE: 84 MMHG | HEIGHT: 67 IN | HEART RATE: 105 BPM | BODY MASS INDEX: 25.11 KG/M2

## 2021-08-25 DIAGNOSIS — I25.118 CORONARY ARTERY DISEASE OF NATIVE ARTERY OF NATIVE HEART WITH STABLE ANGINA PECTORIS (HCC): Primary | ICD-10-CM

## 2021-08-25 DIAGNOSIS — J43.9 PULMONARY EMPHYSEMA, UNSPECIFIED EMPHYSEMA TYPE (HCC): ICD-10-CM

## 2021-08-25 DIAGNOSIS — I10 ESSENTIAL HYPERTENSION: ICD-10-CM

## 2021-08-25 DIAGNOSIS — E78.5 DYSLIPIDEMIA: ICD-10-CM

## 2021-08-25 DIAGNOSIS — F17.200 SMOKER: ICD-10-CM

## 2021-08-25 PROCEDURE — 99214 OFFICE O/P EST MOD 30 MIN: CPT | Performed by: INTERNAL MEDICINE

## 2021-08-25 RX ORDER — METOPROLOL TARTRATE 25 MG/1
12.5 TABLET, FILM COATED ORAL 2 TIMES DAILY
Qty: 30 TABLET | Refills: 4 | Status: SHIPPED | OUTPATIENT
Start: 2021-08-25

## 2021-08-25 NOTE — PROGRESS NOTES
1. Have you been to the ER, urgent care clinic since your last visit? Hospitalized since your last visit?     no  2. Have you seen or consulted any other health care providers outside of the [address] [place] [address] since your last visit? Include any pap smears or colon screening.       No

## 2021-09-01 NOTE — PROGRESS NOTES
HISTORY OF PRESENT ILLNESS  Shani Watts is a 62 y.o. male. Dizziness  The history is provided by the patient. This is a chronic problem. The problem occurs daily. The problem has been gradually improving. Associated symptoms include shortness of breath. Shortness of Breath  The history is provided by the patient. This is a chronic problem. The problem occurs intermittently. The problem has been gradually improving. Pertinent negatives include no ear pain, no neck pain, no wheezing, no rash and no leg swelling. Associated medical issues do not include CAD or heart failure. Review of Systems   Constitutional: Negative for chills and weight loss. HENT: Negative for congestion, ear discharge, ear pain, hearing loss, nosebleeds and tinnitus. Eyes: Negative for blurred vision. Respiratory: Positive for shortness of breath. Negative for wheezing and stridor. Cardiovascular: Negative for leg swelling. Gastrointestinal: Negative for heartburn. Musculoskeletal: Negative for myalgias and neck pain. Skin: Negative for itching and rash. Neurological: Positive for dizziness. Negative for tingling, tremors, focal weakness and loss of consciousness. Psychiatric/Behavioral: Negative for depression and suicidal ideas.      Family History   Problem Relation Age of Onset    No Known Problems Mother     Heart Attack Father     Stroke Brother        Past Medical History:   Diagnosis Date    Chronic lung disease        Past Surgical History:   Procedure Laterality Date    HX HEART CATHETERIZATION  09/2018    HX HERNIA REPAIR         Social History     Tobacco Use    Smoking status: Current Every Day Smoker     Packs/day: 1.00     Years: 46.00     Pack years: 46.00     Start date: 7/11/1973    Smokeless tobacco: Never Used    Tobacco comment: pack and 1/2 cigarettes a day   Substance Use Topics    Alcohol use: Yes     Comment: 3-4 drinks a week       Allergies   Allergen Reactions    Atenolol Other (comments)     Pt c/o chest pain and is reluctant to take med due to being told that it could cause \"death\"    Nalbuphine Other (comments)     Extremely nauseous    Seasonale [Levonorgestrel-Ethinyl Estrad] Other (comments)     Not allergic to this medication per patient       Outpatient Medications Marked as Taking for the 8/25/21 encounter (Office Visit) with Michelle Dudley MD   Medication Sig Dispense Refill    metoprolol tartrate (LOPRESSOR) 25 mg tablet Take 0.5 Tablets by mouth two (2) times a day. 30 Tablet 4    clopidogreL (PLAVIX) 75 mg tab Take 1 Tablet by mouth daily. 90 Tablet 3    cholecalciferol (VITAMIN D3) (2,000 UNITS /50 MCG) cap capsule Take  by mouth two (2) times a day.  lisinopriL (PRINIVIL, ZESTRIL) 5 mg tablet Take 1 Tab by mouth daily. (Patient taking differently: Take 5 mg by mouth as needed.) 30 Tab 6    albuterol (PROVENTIL HFA, VENTOLIN HFA, PROAIR HFA) 90 mcg/actuation inhaler Take 1-2 Puffs by inhalation every four (4) hours as needed for Wheezing or Shortness of Breath. 3 Inhaler 4    aspirin 81 mg chewable tablet Take 1 Tab by mouth daily. 90 Tab 3        Visit Vitals  /84   Pulse (!) 105   Ht 5' 7\" (1.702 m)   Wt 72.6 kg (160 lb)   BMI 25.06 kg/m²     Physical Exam  Constitutional:       General: He is not in acute distress. Appearance: He is well-developed. He is not diaphoretic. HENT:      Head: Atraumatic. Mouth/Throat:      Pharynx: No oropharyngeal exudate. Eyes:      General: No scleral icterus. Right eye: No discharge. Left eye: No discharge. Conjunctiva/sclera: Conjunctivae normal.   Neck:      Thyroid: No thyromegaly. Vascular: No JVD. Trachea: No tracheal deviation. Cardiovascular:      Rate and Rhythm: Normal rate and regular rhythm. Pulses:           Carotid pulses are on the right side with bruit. Heart sounds: No murmur heard. No gallop.     Pulmonary:      Effort: Pulmonary effort is normal.      Breath sounds: Normal breath sounds. No stridor. No wheezing or rales. Abdominal:      Palpations: Abdomen is soft. Tenderness: There is no abdominal tenderness. There is no guarding or rebound. Musculoskeletal:         General: No tenderness. Normal range of motion. Cervical back: Normal range of motion and neck supple. Lymphadenopathy:      Cervical: No cervical adenopathy. Skin:     General: Skin is warm. Neurological:      Mental Status: He is alert and oriented to person, place, and time. Motor: No abnormal muscle tone. Psychiatric:         Behavior: Behavior normal.       ekg sinus rhythm with no acute st-t changes  Echo and lexiscan from 2012 reviewed  09/21/18   CARDIAC PROCEDURE 09/21/2018 9/21/2018    Narrative Single vessel coronary artery disease. Preserved LV function. S/p ptca/stent to OM1. Continue DAPT     Signed by: Eddie Flannery MD     ASSESSMENT and PLAN    ICD-10-CM ICD-9-CM    1. Coronary artery disease of native artery of native heart with stable angina pectoris ([facility]  I25.118 414.01      413.9    2. Essential hypertension  I10 401.9    3. Pulmonary emphysema, unspecified emphysema type ([facility]  J43.9 492.8    4. Smoker  F17.200 305.1    5. Dyslipidemia  E78.5 272.4      Orders Placed This Encounter    metoprolol tartrate (LOPRESSOR) 25 mg tablet     Sig: Take 0.5 Tablets by mouth two (2) times a day. Dispense:  30 Tablet     Refill:  4     Follow-up and Dispositions    · Return in about 4 months (around 12/25/2021). very strongly urged to quit smoking to reduce cardiovascular risk  cardiovascular risk and specific lipid/LDL goals reviewed  use of aspirin to prevent MI and TIA's discussed. Patient with long standing h/o smoking and family h/o premature CAD seen for chest pain- tightness radiating to left jaw associated with mild sob. Patient underwent cardiac cath subsequent PCI to obtuse marginal 1 artery.     Recent carotid study -revealed less than 50% internal carotid artery stenosis. Seen for follow-up. Dizziness and sob improving on current meds. Prefers medical management. F/u in 4 months. Meanwhile recommend smoking cessation.

## 2021-12-29 RX ORDER — MECLIZINE HCL 12.5 MG 12.5 MG/1
12.5 TABLET ORAL
Qty: 30 TABLET | Refills: 1 | Status: SHIPPED | OUTPATIENT
Start: 2021-12-29 | End: 2022-01-08

## 2022-03-18 PROBLEM — I10 ESSENTIAL HYPERTENSION: Status: ACTIVE | Noted: 2018-08-27

## 2022-03-18 PROBLEM — Z82.49 FAMILY HISTORY OF PREMATURE CAD: Status: ACTIVE | Noted: 2018-08-27

## 2022-03-19 PROBLEM — I20.0 INTERMEDIATE CORONARY SYNDROME (HCC): Status: ACTIVE | Noted: 2018-08-27

## 2022-03-19 PROBLEM — K43.9 VENTRAL HERNIA WITHOUT OBSTRUCTION OR GANGRENE: Status: ACTIVE | Noted: 2018-12-15

## 2022-03-19 PROBLEM — J44.9 COPD (CHRONIC OBSTRUCTIVE PULMONARY DISEASE) (HCC): Status: ACTIVE | Noted: 2018-08-27

## 2022-03-19 PROBLEM — I25.10 CAD (CORONARY ARTERY DISEASE): Status: ACTIVE | Noted: 2018-09-21

## 2022-03-19 PROBLEM — F17.200 SMOKER: Status: ACTIVE | Noted: 2018-08-27

## 2022-03-19 PROBLEM — E78.5 DYSLIPIDEMIA: Status: ACTIVE | Noted: 2018-10-10

## 2022-03-19 PROBLEM — Z65.8 PSYCHOSOCIAL STRESSORS: Status: ACTIVE | Noted: 2018-12-14

## 2022-03-20 PROBLEM — F10.10 ALCOHOL USE DISORDER, MILD, ABUSE: Status: ACTIVE | Noted: 2018-12-14

## 2022-03-20 PROBLEM — F32.A DEPRESSION: Status: ACTIVE | Noted: 2018-12-14

## 2022-03-20 PROBLEM — Z63.4 BEREAVEMENT: Status: ACTIVE | Noted: 2018-12-14

## 2022-04-08 ENCOUNTER — OFFICE VISIT (OUTPATIENT)
Dept: CARDIOLOGY CLINIC | Age: 62
End: 2022-04-08
Payer: MEDICAID

## 2022-04-08 VITALS
HEIGHT: 67 IN | HEART RATE: 85 BPM | DIASTOLIC BLOOD PRESSURE: 81 MMHG | WEIGHT: 160 LBS | BODY MASS INDEX: 25.11 KG/M2 | OXYGEN SATURATION: 97 % | SYSTOLIC BLOOD PRESSURE: 131 MMHG

## 2022-04-08 DIAGNOSIS — E78.5 DYSLIPIDEMIA: ICD-10-CM

## 2022-04-08 DIAGNOSIS — F17.200 SMOKER: ICD-10-CM

## 2022-04-08 DIAGNOSIS — I25.118 CORONARY ARTERY DISEASE OF NATIVE ARTERY OF NATIVE HEART WITH STABLE ANGINA PECTORIS (HCC): Primary | ICD-10-CM

## 2022-04-08 DIAGNOSIS — J43.9 PULMONARY EMPHYSEMA, UNSPECIFIED EMPHYSEMA TYPE (HCC): ICD-10-CM

## 2022-04-08 DIAGNOSIS — F10.10 ETOH ABUSE: ICD-10-CM

## 2022-04-08 DIAGNOSIS — I10 ESSENTIAL HYPERTENSION: ICD-10-CM

## 2022-04-08 PROCEDURE — 99214 OFFICE O/P EST MOD 30 MIN: CPT | Performed by: NURSE PRACTITIONER

## 2022-04-08 NOTE — PATIENT INSTRUCTIONS
Learning About Benefits From Quitting Smoking  How does quitting smoking make you healthier? If you're thinking about quitting smoking, you may have a few reasons to be smoke-free. Your health may be one of them. · When you quit smoking, you lower your risks for cancer, lung disease, heart attack, stroke, blood vessel disease, and blindness from macular degeneration. · When you're smoke-free, you get sick less often, and you heal faster. You are less likely to get colds, flu, bronchitis, and pneumonia. · As a nonsmoker, you may find that your mood is better and you are less stressed. When and how will you feel healthier? Quitting has real health benefits that start from day 1 of being smoke-free. And the longer you stay smoke-free, the healthier you get and the better you feel. The first hours  · After just 20 minutes, your blood pressure and heart rate go down. That means there's less stress on your heart and blood vessels. · Within 12 hours, the level of carbon monoxide in your blood drops back to normal. That makes room for more oxygen. With more oxygen in your body, you may notice that you have more energy than when you smoked. After 2 weeks  · Your lungs start to work better. · Your risk of heart attack starts to drop. After 1 month  · When your lungs are clear, you cough less and breathe deeper, so it's easier to be active. · Your sense of taste and smell return. That means you can enjoy food more than you have since you started smoking. Over the years  · Over the years, your risks of heart disease, heart attack, and stroke are lower. · After 10 years, your risk of dying from lung cancer is cut by about half. And your risk for many other types of cancer is lower too. How would quitting help others in your life? When you quit smoking, you improve the health of everyone who now breathes in your smoke. · Their heart, lung, and cancer risks drop, much like yours. · They are sick less.  For babies and small children, living smoke-free means they're less likely to have ear infections, pneumonia, and bronchitis. · If you're a woman who is or will be pregnant someday, quitting smoking means a healthier . · Children who are close to you are less likely to become adult smokers. Where can you learn more? Go to http://www.gray.com/  Enter O319 in the search box to learn more about \"Learning About Benefits From Quitting Smoking. \"  Current as of: 2021               Content Version: 13.2  © 1014-6336 [employer]wise, [employer]. Care instructions adapted under license by [employer] (which disclaims liability or warranty for this information). If you have questions about a medical condition or this instruction, always ask your healthcare professional. [address] any warranty or liability for your use of this information.

## 2022-04-08 NOTE — PROGRESS NOTES
1. Have you been to the ER, urgent care clinic since your last visit? Hospitalized since your last visit? Yes Where: [facility] for [facility]    2. Have you seen or consulted any other health care providers outside of the [address] [place] [address] since your last visit? Include any pap smears or colon screening. Yes Where: Dr. Sahara Valenzuela PCP for routine checkup     3. Do you need any refills today?   no

## 2022-04-08 NOTE — PROGRESS NOTES
HISTORY OF PRESENT ILLNESS  Dat Cotto is a 62 y.o. male. 4/2022  Patient seen for complaint of elevated heart rate with low blood pressure. He complains of episodes of chest pain with shortness of breath. He reports is taking lisinopril as needed only, he is no longer taking metoprolol. He is taking Plavix and was recently started on cholesterol medication by his primary doctor but is unsure of name. Of note he had COVID-19 in January 2022    Dizziness  The history is provided by the patient. This is a chronic problem. The problem occurs daily. The problem has been gradually improving. Associated symptoms include chest pain and shortness of breath. Shortness of Breath  The history is provided by the patient. This is a chronic problem. The problem occurs intermittently. The problem has been gradually improving. Associated symptoms include chest pain. Pertinent negatives include no ear pain, no neck pain, no wheezing, no rash and no leg swelling. Associated medical issues do not include CAD or heart failure. Review of Systems   Constitutional: Negative for chills and weight loss. HENT: Negative for congestion, ear discharge, ear pain, hearing loss, nosebleeds and tinnitus. Eyes: Negative for blurred vision. Respiratory: Positive for shortness of breath. Negative for wheezing and stridor. Cardiovascular: Positive for chest pain. Negative for leg swelling. Gastrointestinal: Negative for heartburn. Musculoskeletal: Negative for myalgias and neck pain. Skin: Negative for itching and rash. Neurological: Positive for dizziness. Negative for tingling, tremors, focal weakness and loss of consciousness. Psychiatric/Behavioral: Negative for depression and suicidal ideas.      Family History   Problem Relation Age of Onset    No Known Problems Mother     Heart Attack Father     Stroke Brother        Past Medical History:   Diagnosis Date    Chronic lung disease        Past Surgical History:   Procedure Laterality Date    HX HEART CATHETERIZATION  09/2018    HX HERNIA REPAIR         Social History     Tobacco Use    Smoking status: Current Every Day Smoker     Packs/day: 1.00     Years: 46.00     Pack years: 46.00     Start date: 7/11/1973    Smokeless tobacco: Never Used    Tobacco comment: pack and 1/2 cigarettes a day   Substance Use Topics    Alcohol use: Yes     Comment: 3-4 drinks a week       Allergies   Allergen Reactions    Atenolol Other (comments)     Pt c/o chest pain and is reluctant to take med due to being told that it could cause \"death\"    Nalbuphine Other (comments)     Extremely nauseous    Seasonale [Levonorgestrel-Ethinyl Estrad] Other (comments)     Not allergic to this medication per patient            Visit Vitals  /81 (BP 1 Location: Left upper arm, BP Patient Position: Sitting, BP Cuff Size: Adult)   Pulse 85   Ht 5' 7\" (1.702 m)   Wt 72.6 kg (160 lb)   SpO2 97%   BMI 25.06 kg/m²     Physical Exam  Constitutional:       General: He is not in acute distress. Appearance: He is well-developed. He is not diaphoretic. HENT:      Head: Atraumatic. Mouth/Throat:      Pharynx: No oropharyngeal exudate. Eyes:      General: No scleral icterus. Right eye: No discharge. Left eye: No discharge. Conjunctiva/sclera: Conjunctivae normal.   Neck:      Thyroid: No thyromegaly. Vascular: No JVD. Trachea: No tracheal deviation. Cardiovascular:      Rate and Rhythm: Normal rate and regular rhythm. Pulses:           Carotid pulses are on the right side with bruit. Heart sounds: No murmur heard. No friction rub. No gallop. Pulmonary:      Effort: Pulmonary effort is normal.      Breath sounds: Normal breath sounds. No stridor. No wheezing or rales. Abdominal:      Palpations: Abdomen is soft. Tenderness: There is no abdominal tenderness. There is no guarding or rebound.    Musculoskeletal:         General: No tenderness. Normal range of motion. Cervical back: Normal range of motion and neck supple. Right lower leg: No edema. Left lower leg: No edema. Lymphadenopathy:      Cervical: No cervical adenopathy. Skin:     General: Skin is warm. Neurological:      Mental Status: He is alert and oriented to person, place, and time. Motor: No abnormal muscle tone. Psychiatric:         Behavior: Behavior normal.       ekg sinus rhythm with no acute st-t changes  Echo and lexiscan from 2012 reviewed  09/21/18   CARDIAC PROCEDURE 09/21/2018 9/21/2018    Narrative Single vessel coronary artery disease. Preserved LV function. S/p ptca/stent to OM1. Continue DAPT     Signed by: Tia Alexander MD     ASSESSMENT and PLAN    ICD-10-CM ICD-9-CM    1. Coronary artery disease of native artery of native heart with stable angina pectoris ([facility] [address]  I25.118 414.01 ECHO ADULT COMPLETE     413.9 NUCLEAR CARDIAC STRESS TEST   2. Essential hypertension  I10 401.9    3. Pulmonary emphysema, unspecified emphysema type ([facility] [address]  J43.9 492.8    4. Dyslipidemia  E78.5 272.4    5. Smoker  F17.200 305.1    6. ETOH abuse  F10.10 305.00      Orders Placed This Encounter    ECHO ADULT COMPLETE     Standing Status:   Future     Standing Expiration Date:   4/8/2023     Order Specific Question:   Contrast Enhancement (Bubble Study, Definity, Optison) may be used if criteria listed in established evidence-based protocol has been identified. Answer:   Yes     Order Specific Question:   Enhanced Imaging (Myocardial Strain, 3D post-processing) may be used if criteria listed in established evidence-based protocol has been identified. Answer:   Yes     Follow-up and Dispositions    · Return in about 2 weeks (around 4/22/2022) for Post testing.        very strongly urged to quit smoking to reduce cardiovascular risk  cardiovascular risk and specific lipid/LDL goals reviewed  use of aspirin to prevent MI and TIA's discussed. Patient with long standing h/o smoking and family h/o premature CAD seen for chest pain- tightness radiating to left jaw associated with mild sob. Patient underwent cardiac cath subsequent PCI to obtuse marginal 1 artery. Recent carotid study -revealed less than 50% internal carotid artery stenosis. Seen for follow-up. Dizziness and sob improving on current meds. Prefers medical management. F/u in 4 months. Meanwhile recommend smoking cessation. 4/2022  Patient with history of CAD seen for complaints of chest pain with shortness of breath. Will obtain echocardiogram and nuclear stress test to rule out ischemia or LV dysfunction. Advised to continue Plavix and statin. Advised to take metoprolol 12.5 mg twice daily which will aid in heart rate and blood pressure. Encourage smoking cessation. Patient reports has recently stopped alcohol intake. Patient to follow-up post testing.

## 2024-07-17 DIAGNOSIS — Z82.49 FAMILY HISTORY OF PREMATURE CAD: Primary | ICD-10-CM

## 2024-07-18 RX ORDER — CLOPIDOGREL BISULFATE 75 MG/1
75 TABLET ORAL DAILY
Qty: 90 TABLET | Refills: 3 | Status: SHIPPED | OUTPATIENT
Start: 2024-07-18

## 2024-08-05 PROBLEM — I21.3 STEMI (ST ELEVATION MYOCARDIAL INFARCTION) (HCC): Status: ACTIVE | Noted: 2024-07-06

## 2024-08-05 PROBLEM — J39.2 NASOPHARYNGEAL MASS: Status: ACTIVE | Noted: 2024-08-04

## 2024-08-05 PROBLEM — R55 NEAR SYNCOPE: Status: ACTIVE | Noted: 2024-08-02

## 2024-08-05 RX ORDER — NICOTINE POLACRILEX 2 MG/1
2 GUM, CHEWING ORAL PRN
COMMUNITY
Start: 2024-07-07

## 2024-08-05 RX ORDER — CARVEDILOL 6.25 MG/1
6.25 TABLET ORAL 2 TIMES DAILY WITH MEALS
COMMUNITY
Start: 2024-07-07

## 2024-08-05 RX ORDER — NITROGLYCERIN 0.4 MG/1
0.4 TABLET SUBLINGUAL EVERY 5 MIN PRN
COMMUNITY
Start: 2024-07-07

## 2024-08-08 ENCOUNTER — OFFICE VISIT (OUTPATIENT)
Age: 64
End: 2024-08-08
Payer: MEDICARE

## 2024-08-08 VITALS
RESPIRATION RATE: 18 BRPM | DIASTOLIC BLOOD PRESSURE: 84 MMHG | BODY MASS INDEX: 26.53 KG/M2 | SYSTOLIC BLOOD PRESSURE: 127 MMHG | WEIGHT: 169 LBS | TEMPERATURE: 97.4 F | HEIGHT: 67 IN | HEART RATE: 105 BPM | OXYGEN SATURATION: 97 %

## 2024-08-08 DIAGNOSIS — F17.210 NICOTINE DEPENDENCE, CIGARETTES, UNCOMPLICATED: ICD-10-CM

## 2024-08-08 DIAGNOSIS — R06.09 DYSPNEA ON EXERTION: ICD-10-CM

## 2024-08-08 DIAGNOSIS — Z87.891 PERSONAL HISTORY OF TOBACCO USE, PRESENTING HAZARDS TO HEALTH: ICD-10-CM

## 2024-08-08 DIAGNOSIS — J44.9 COPD, GROUP B, BY GOLD 2017 CLASSIFICATION (HCC): Primary | ICD-10-CM

## 2024-08-08 PROCEDURE — 3079F DIAST BP 80-89 MM HG: CPT | Performed by: INTERNAL MEDICINE

## 2024-08-08 PROCEDURE — 99204 OFFICE O/P NEW MOD 45 MIN: CPT | Performed by: INTERNAL MEDICINE

## 2024-08-08 PROCEDURE — 94060 EVALUATION OF WHEEZING: CPT | Performed by: INTERNAL MEDICINE

## 2024-08-08 PROCEDURE — 3074F SYST BP LT 130 MM HG: CPT | Performed by: INTERNAL MEDICINE

## 2024-08-08 PROCEDURE — 99407 BEHAV CHNG SMOKING > 10 MIN: CPT | Performed by: INTERNAL MEDICINE

## 2024-08-08 RX ORDER — FLUTICASONE FUROATE, UMECLIDINIUM BROMIDE AND VILANTEROL TRIFENATATE 100; 62.5; 25 UG/1; UG/1; UG/1
1 POWDER RESPIRATORY (INHALATION) DAILY
Qty: 3 EACH | Refills: 3 | Status: SHIPPED | OUTPATIENT
Start: 2024-08-08

## 2024-08-08 RX ORDER — VARENICLINE TARTRATE 1 MG/1
1 TABLET, FILM COATED ORAL 2 TIMES DAILY
Qty: 60 TABLET | Refills: 2 | Status: SHIPPED | OUTPATIENT
Start: 2024-08-08

## 2024-08-08 RX ORDER — ALBUTEROL SULFATE 90 UG/1
1-2 AEROSOL, METERED RESPIRATORY (INHALATION) EVERY 4 HOURS PRN
Qty: 1 EACH | Refills: 5 | Status: SHIPPED | OUTPATIENT
Start: 2024-08-08

## 2024-08-08 RX ORDER — VARENICLINE TARTRATE 0.5 (11)-1
1 KIT ORAL SEE ADMIN INSTRUCTIONS
Qty: 1 EACH | Refills: 0 | Status: SHIPPED | OUTPATIENT
Start: 2024-08-08

## 2024-08-08 RX ORDER — AMOXICILLIN AND CLAVULANATE POTASSIUM 875; 125 MG/1; MG/1
1 TABLET, FILM COATED ORAL 2 TIMES DAILY
COMMUNITY
Start: 2024-08-04 | End: 2024-08-16

## 2024-08-08 RX ORDER — FLUTICASONE FUROATE, UMECLIDINIUM BROMIDE AND VILANTEROL TRIFENATATE 100; 62.5; 25 UG/1; UG/1; UG/1
1 POWDER RESPIRATORY (INHALATION) DAILY
Qty: 1 EACH | Refills: 0 | Status: SHIPPED | COMMUNITY
Start: 2024-08-08

## 2024-08-08 ASSESSMENT — PATIENT HEALTH QUESTIONNAIRE - PHQ9
SUM OF ALL RESPONSES TO PHQ QUESTIONS 1-9: 0
SUM OF ALL RESPONSES TO PHQ9 QUESTIONS 1 & 2: 0
1. LITTLE INTEREST OR PLEASURE IN DOING THINGS: NOT AT ALL
SUM OF ALL RESPONSES TO PHQ QUESTIONS 1-9: 0
2. FEELING DOWN, DEPRESSED OR HOPELESS: NOT AT ALL
SUM OF ALL RESPONSES TO PHQ QUESTIONS 1-9: 0
SUM OF ALL RESPONSES TO PHQ QUESTIONS 1-9: 0

## 2024-08-08 NOTE — PROGRESS NOTES
Salty Rivera presents today for   Chief Complaint   Patient presents with    COPD     Referred by NP Sruthi Huerta     Results     CT ST neck 3/6 & 8/3/2024 ([facility]), CXR  & 2024 ([facility]), Echo  & 2024 ([facility])       Is someone accompanying this pt? No    Is the patient using any DME equipment during OV? No    -DME Company N/A    Depression Screenin/8/2024    11:23 AM   PHQ-9 Questionaire   Little interest or pleasure in doing things 0   Feeling down, depressed, or hopeless 0   PHQ-9 Total Score 0       Learning Needs Questionnaire:     Who is the primary learner? Patient    What is the preferred language for health care of the primary learner? ENGLISH    How does the primary learner prefer to learn new concepts? DEMONSTRATION    Answered By Patient    Relationship to Learner SELF          Fall Risk:         2024    11:34 AM   Fall Risk   Do you feel unsteady or are you worried about falling?  yes   2 or more falls in past year? yes   Fall with injury in past year? yes        Abuse Screenin/8/2024    11:00 AM   AMB Abuse Screening   Do you ever feel afraid of your partner? N   Are you in a relationship with someone who physically or mentally threatens you? N   Is it safe for you to go home? Y         Coordination of Care:    1. Have you been to the ER, urgent care clinic since your last visit? Hospitalized since your last visit? Yes. [facility] ED-2023-abscess of anal and rectal region, 2023-wound and abscess check, and 3/6/2024-sore throat, [facility] ED-2024-abscess of buttock and 2024-blister of gingiva with infection and [facility]--2024-STEMI & -2024-near syncope    2. Have you seen or consulted any other health care providers outside of the [facility] since your last visit? Include any pap smears or colon screening. Yes. Dr. Pam Rehman, PCP    Medication list has been 
8/8/2024 7/18/24   Zen Rodriguez, APRN - NP   aspirin 81 MG chewable tablet Take 1 tablet by mouth daily 12/17/18  Yes ProviderIvette MD   oxyCODONE-acetaminophen (PERCOCET)  MG per tablet Take 1 tablet by mouth every 4 hours as needed for Pain.   Yes Provider, MD Ivette   diclofenac sodium (VOLTAREN) 1 % GEL Apply topically 4 times daily as needed for Pain 11/21/23  Yes Provider, MD Ivette   nicotine (NICODERM CQ) 21 MG/24HR 1 PATCH TO SKIN TRANSDERMAL ONCE A DAY 30 DAY(S)   Yes Provider, Historical, MD   rosuvastatin (CRESTOR) 20 MG tablet Take 1 tablet by mouth daily   Yes Provider, MD Ivette   lisinopril (PRINIVIL;ZESTRIL) 10 MG tablet Take 1 tablet by mouth daily  Patient not taking: Reported on 8/8/2024 12/17/18 8/8/24  Ivette Yuan MD   simvastatin (ZOCOR) 20 MG tablet Take 1 tablet by mouth nightly  Patient not taking: Reported on 8/8/2024 12/17/18 8/8/24  Provider, MD Ivette   tiotropium (SPIRIVA RESPIMAT) 2.5 MCG/ACT AERS inhaler Inhale 2 puffs into the lungs  Patient not taking: Reported on 8/8/2024 2/13/18 8/8/24  Provider, MD Ivette   acetaminophen-codeine (TYLENOL #3) 300-30 MG per tablet   8/8/24  Provider, MD Ivette   ALPRAZolam (XANAX) 1 MG tablet   8/8/24  Provider, MD Ivette   amLODIPine (NORVASC) 5 MG tablet Take 1 tablet by mouth daily  Patient not taking: Reported on 8/8/2024 8/8/24  Provider, MD Ivette   atorvastatin (LIPITOR) 20 MG tablet Take 1 tablet by mouth daily  Patient not taking: Reported on 8/8/2024 8/8/24  ProviderIvette MD   buprenorphine (BELBUCA) 75 MCG FILM buccal film   8/8/24  Provider, MD Ivette   chlorhexidine (PERIDEX) 0.12 % solution   8/8/24  ProviderIvette MD   amoxicillin (AMOXIL) 500 MG tablet Take 1 tablet by mouth 2 times daily  Patient not taking: Reported on 8/8/2024 8/8/24  Provider, MD Ivette   cyclobenzaprine (FLEXERIL) 10 MG tablet   8/8/24  Provider, MD Ivette

## 2024-08-16 ENCOUNTER — TELEPHONE (OUTPATIENT)
Age: 64
End: 2024-08-16

## 2024-08-16 ENCOUNTER — OFFICE VISIT (OUTPATIENT)
Facility: CLINIC | Age: 64
End: 2024-08-16
Payer: COMMERCIAL

## 2024-08-16 VITALS
WEIGHT: 168 LBS | TEMPERATURE: 97.2 F | RESPIRATION RATE: 19 BRPM | HEIGHT: 67 IN | DIASTOLIC BLOOD PRESSURE: 78 MMHG | SYSTOLIC BLOOD PRESSURE: 126 MMHG | HEART RATE: 109 BPM | BODY MASS INDEX: 26.37 KG/M2 | OXYGEN SATURATION: 96 %

## 2024-08-16 DIAGNOSIS — Z11.4 ENCOUNTER FOR SCREENING FOR HIV: ICD-10-CM

## 2024-08-16 DIAGNOSIS — Z13.220 SCREENING, LIPID: ICD-10-CM

## 2024-08-16 DIAGNOSIS — Z82.49 FAMILY HISTORY OF PREMATURE CAD: ICD-10-CM

## 2024-08-16 DIAGNOSIS — Z12.11 SCREEN FOR COLON CANCER: ICD-10-CM

## 2024-08-16 DIAGNOSIS — Z13.29 THYROID DISORDER SCREENING: ICD-10-CM

## 2024-08-16 DIAGNOSIS — J43.9 PULMONARY EMPHYSEMA, UNSPECIFIED EMPHYSEMA TYPE (HCC): ICD-10-CM

## 2024-08-16 DIAGNOSIS — Z11.59 ENCOUNTER FOR HCV SCREENING TEST FOR LOW RISK PATIENT: Primary | ICD-10-CM

## 2024-08-16 DIAGNOSIS — Z13.1 SCREENING FOR DIABETES MELLITUS (DM): ICD-10-CM

## 2024-08-16 DIAGNOSIS — I10 PRIMARY HYPERTENSION: ICD-10-CM

## 2024-08-16 PROCEDURE — 3074F SYST BP LT 130 MM HG: CPT | Performed by: FAMILY MEDICINE

## 2024-08-16 PROCEDURE — 99205 OFFICE O/P NEW HI 60 MIN: CPT | Performed by: FAMILY MEDICINE

## 2024-08-16 PROCEDURE — 3078F DIAST BP <80 MM HG: CPT | Performed by: FAMILY MEDICINE

## 2024-08-16 RX ORDER — IPRATROPIUM BROMIDE AND ALBUTEROL SULFATE 2.5; .5 MG/3ML; MG/3ML
1 SOLUTION RESPIRATORY (INHALATION) EVERY 4 HOURS
Qty: 360 ML | Refills: 1 | Status: SHIPPED | OUTPATIENT
Start: 2024-08-16

## 2024-08-16 SDOH — ECONOMIC STABILITY: FOOD INSECURITY: WITHIN THE PAST 12 MONTHS, YOU WORRIED THAT YOUR FOOD WOULD RUN OUT BEFORE YOU GOT MONEY TO BUY MORE.: NEVER TRUE

## 2024-08-16 SDOH — ECONOMIC STABILITY: FOOD INSECURITY: WITHIN THE PAST 12 MONTHS, THE FOOD YOU BOUGHT JUST DIDN'T LAST AND YOU DIDN'T HAVE MONEY TO GET MORE.: NEVER TRUE

## 2024-08-16 SDOH — ECONOMIC STABILITY: INCOME INSECURITY: HOW HARD IS IT FOR YOU TO PAY FOR THE VERY BASICS LIKE FOOD, HOUSING, MEDICAL CARE, AND HEATING?: NOT HARD AT ALL

## 2024-08-16 ASSESSMENT — PATIENT HEALTH QUESTIONNAIRE - PHQ9
SUM OF ALL RESPONSES TO PHQ QUESTIONS 1-9: 0
SUM OF ALL RESPONSES TO PHQ9 QUESTIONS 1 & 2: 0
1. LITTLE INTEREST OR PLEASURE IN DOING THINGS: NOT AT ALL
2. FEELING DOWN, DEPRESSED OR HOPELESS: NOT AT ALL

## 2024-08-16 ASSESSMENT — ENCOUNTER SYMPTOMS
CHEST TIGHTNESS: 1
SHORTNESS OF BREATH: 1
COUGH: 1
WHEEZING: 1

## 2024-08-16 NOTE — TELEPHONE ENCOUNTER
Referral for screening colonoscopy. Please review and advise.      Referral  Referral # [id number]  Referral Information    Referral # Creation Date Referral Status Status Update    [id number] 08/16/2024 Pending Review 08/16/2024: Status History     Status Reason Referral Type Referral Reasons Referral Class   Other Eval and Treat Specialty Services Required Internal     To Specialty To Provider To Location/Place of Service To Department   Gastroenterology Nilesh Alfaro MD none [facility] - GASTROENTEROLOGY     To Vendor Referred By By Location/Place of Service By Department   none Shani Lynn MD [facility]     Priority Start Date Expiration Date Referral Entered By   Routine 08/16/2024 08/16/2025 Shani Lynn MD     Visits Requested Visits Authorized Visits Completed Visits Scheduled   2 2       Coverages    Payer Plan Auth. Required? Covered? Member # Authorized From Expires Auth # Precert. # Comment   AETNA AETNA -- Covered [id number] 3/1/2024 -- -- -- --   [facility] MEDICAID ANTHEM [facility] HEALTHKEEPERS PLUS -- Covered [id number] 10/1/2022 -- -- -- --     Referral Information    Referral # Creation Date Referral Status Status Update    [id number] 08/16/2024 Pending Review 08/16/2024: Status History     Status Reason Referral Type Referral Reasons Referral Class   Other Eval and Treat Specialty Services Required Internal     To Specialty To Provider To Location/Place of Service To Department   Gastroenterology Nilesh Alfaro MD none [facility] - GASTROENTEROLOGY     To Vendor Referred By By Location/Place of Service By Department   none Shani Lynn MD [facility]     Priority Start Date Expiration Date Referral Entered By   Routine 08/16/2024 08/16/2025 Shani Lynn MD     Visits Requested Visits Authorized Visits Completed Visits Scheduled   2 2

## 2024-08-16 NOTE — PROGRESS NOTES
\"Have you been to the ER, urgent care clinic since your last visit?  Hospitalized since your last visit?\"    NO    “Have you seen or consulted any other health care providers outside of [facility] since your last visit?”    NO        “Have you had a colorectal cancer screening such as a colonoscopy/FIT/Cologuard?    NO    No colonoscopy on file  No cologuard on file  No FIT/FOBT on file   No flexible sigmoidoscopy on file         Click Here for Release of Records Request

## 2024-08-16 NOTE — PROGRESS NOTES
Salty Rivera is a 63 y.o. male who presents to the office today for the following:  Chief Complaint   Patient presents with    New Patient    Dizziness       Allergies   Allergen Reactions    Atenolol Other (See Comments)     Pt c/o chest pain and is reluctant to take med due to being told that it could cause \"death\"    Levonorgestrel-Ethinyl Estrad Other (See Comments)     Not allergic to this medication per patient    Nalbuphine Other (See Comments)     Extremely nauseous    Seasonal Other (See Comments)         Current Outpatient Medications:     fluticasone-umeclidin-vilant (TRELEGY ELLIPTA) 100-62.5-25 MCG/ACT AEPB inhaler, Inhale 1 puff into the lungs daily Rinse and gargle mouth after each use, Disp: 3 each, Rfl: 3    albuterol sulfate HFA (PROVENTIL HFA) 108 (90 Base) MCG/ACT inhaler, Inhale 1-2 puffs into the lungs every 4 hours as needed for Wheezing or Shortness of Breath Can substitute any formulary approved albuterol formulation (Proventil, Proair, Ventolin, etc), Disp: 1 each, Rfl: 5    Varenicline Tartrate, Starter, (CHANTIX STARTING MONTH TAVON) 0.5 MG X 11 & 1 MG X 42 TBPK, Take 1 Package by mouth See Admin Instructions 0.5mg daily for 4days followed by 0.5mg Twice a day for 3 days, followed by 1mg twice a day, Disp: 1 each, Rfl: 0    varenicline (CHANTIX CONTINUING MONTH TAVON) 1 MG tablet, Take 1 tablet by mouth 2 times daily, Disp: 60 tablet, Rfl: 2    EQ NICOTINE POLACRILEX 2 MG gum, Take 1 each by mouth as needed for Smoking cessation, Disp: , Rfl:     carvedilol (COREG) 6.25 MG tablet, Take 1 tablet by mouth 2 times daily (with meals), Disp: , Rfl:     nitroGLYCERIN (NITROSTAT) 0.4 MG SL tablet, Place 1 tablet under the tongue every 5 minutes as needed for Chest pain, Disp: , Rfl:     ticagrelor (BRILINTA) 90 MG TABS tablet, Take 1 tablet by mouth 2 times daily, Disp: , Rfl:     clopidogrel (PLAVIX) 75 MG tablet, Take 1 tablet by mouth daily (Patient not taking: Reported on 8/8/2024),

## 2024-08-20 ENCOUNTER — HOSPITAL ENCOUNTER (OUTPATIENT)
Facility: HOSPITAL | Age: 64
Discharge: HOME | End: 2024-08-23
Attending: INTERNAL MEDICINE
Payer: COMMERCIAL

## 2024-08-20 DIAGNOSIS — R06.09 DYSPNEA ON EXERTION: ICD-10-CM

## 2024-08-20 DIAGNOSIS — J44.9 COPD, GROUP B, BY GOLD 2017 CLASSIFICATION (HCC): ICD-10-CM

## 2024-08-20 DIAGNOSIS — Z87.891 PERSONAL HISTORY OF TOBACCO USE, PRESENTING HAZARDS TO HEALTH: ICD-10-CM

## 2024-08-20 DIAGNOSIS — F17.210 NICOTINE DEPENDENCE, CIGARETTES, UNCOMPLICATED: ICD-10-CM

## 2024-08-20 PROCEDURE — 71271 CT THORAX LUNG CANCER SCR C-: CPT

## 2024-09-03 ENCOUNTER — OFFICE VISIT (OUTPATIENT)
Age: 64
End: 2024-09-03

## 2024-09-03 VITALS
DIASTOLIC BLOOD PRESSURE: 84 MMHG | BODY MASS INDEX: 27.34 KG/M2 | RESPIRATION RATE: 18 BRPM | SYSTOLIC BLOOD PRESSURE: 138 MMHG | OXYGEN SATURATION: 97 % | HEART RATE: 90 BPM | WEIGHT: 174.2 LBS | HEIGHT: 67 IN

## 2024-09-03 DIAGNOSIS — F17.200 SMOKER: ICD-10-CM

## 2024-09-03 DIAGNOSIS — I21.11 ST ELEVATION MYOCARDIAL INFARCTION INVOLVING RIGHT CORONARY ARTERY (HCC): ICD-10-CM

## 2024-09-03 DIAGNOSIS — I10 ESSENTIAL HYPERTENSION: ICD-10-CM

## 2024-09-03 DIAGNOSIS — J43.9 PULMONARY EMPHYSEMA, UNSPECIFIED EMPHYSEMA TYPE (HCC): ICD-10-CM

## 2024-09-03 DIAGNOSIS — I25.84 CORONARY ARTERY DISEASE DUE TO CALCIFIED CORONARY LESION: Primary | ICD-10-CM

## 2024-09-03 DIAGNOSIS — E78.5 DYSLIPIDEMIA: ICD-10-CM

## 2024-09-03 DIAGNOSIS — I25.10 CORONARY ARTERY DISEASE DUE TO CALCIFIED CORONARY LESION: Primary | ICD-10-CM

## 2024-09-03 RX ORDER — ROSUVASTATIN CALCIUM 20 MG/1
40 TABLET, COATED ORAL DAILY
Status: SHIPPED | COMMUNITY
Start: 2024-09-03

## 2024-09-03 NOTE — PROGRESS NOTES
HISTORY OF PRESENT ILLNESS  Salty Rivera is a 61 y.o. male.  4/2022  Patient seen for complaint of elevated heart rate with low blood pressure.  He complains of episodes of chest pain with shortness of breath.  He reports is taking lisinopril as needed only, he is no longer taking metoprolol.  He is taking Plavix and was recently started on cholesterol medication by his primary doctor but is unsure of name.  Of note he had COVID-19 in January 2022    Dizziness  The history is provided by the patient. This is a chronic problem. The problem occurs daily. The problem has been gradually improving. Associated symptoms include chest pain and shortness of breath.   Shortness of Breath  The history is provided by the patient. This is a chronic problem. The problem occurs intermittently.The problem has been gradually improving. Associated symptoms include chest pain. Pertinent negatives include no ear pain, no neck pain, no wheezing, no rash and no leg swelling. Associated medical issues do not include CAD or heart failure.       Review of Systems   Constitutional: Negative for chills and weight loss.   HENT: Negative for congestion, ear discharge, ear pain, hearing loss, nosebleeds and tinnitus.    Eyes: Negative for blurred vision.   Respiratory: Positive for shortness of breath. Negative for wheezing and stridor.    Cardiovascular: Positive for chest pain. Negative for leg swelling.   Gastrointestinal: Negative for heartburn.   Musculoskeletal: Negative for myalgias and neck pain.   Skin: Negative for itching and rash.   Neurological: Positive for dizziness. Negative for tingling, tremors, focal weakness and loss of consciousness.   Psychiatric/Behavioral: Negative for depression and suicidal ideas.     Family History   Problem Relation Age of Onset    No Known Problems Mother     Stroke Brother     Heart Attack Father        Past Medical History:   Diagnosis Date    Chronic lung disease     Coronary artery

## 2024-09-05 DIAGNOSIS — Z12.11 SCREENING FOR COLON CANCER: Primary | ICD-10-CM

## 2024-09-14 LAB
A/G RATIO: 1.3 RATIO (ref 1.1–2.6)
ALBUMIN: 4.1 G/DL (ref 3.5–5)
ALP BLD-CCNC: 80 U/L (ref 40–125)
ALT SERPL-CCNC: 19 U/L (ref 5–40)
ANION GAP SERPL CALCULATED.3IONS-SCNC: 9 MMOL/L (ref 3–15)
AST SERPL-CCNC: 18 U/L (ref 10–37)
BILIRUB SERPL-MCNC: 0.4 MG/DL (ref 0.2–1.2)
BUN BLDV-MCNC: 18 MG/DL (ref 6–22)
CALCIUM SERPL-MCNC: 9.4 MG/DL (ref 8.4–10.5)
CHLORIDE BLD-SCNC: 106 MMOL/L (ref 98–110)
CHOLESTEROL, TOTAL: 134 MG/DL (ref 110–200)
CHOLESTEROL/HDL RATIO: 2.3 (ref 0–5)
CO2: 24 MMOL/L (ref 20–32)
CREAT SERPL-MCNC: 0.8 MG/DL (ref 0.8–1.6)
ESTIMATED AVERAGE GLUCOSE: 156 MG/DL (ref 91–123)
GFR, ESTIMATED: >60 ML/MIN/1.73 SQ.M.
GLOBULIN: 3.1 G/DL (ref 2–4)
GLUCOSE: 159 MG/DL (ref 70–99)
HBA1C MFR BLD: 7.1 % (ref 4.8–5.6)
HCT VFR BLD CALC: 40.6 % (ref 39.3–51.6)
HDLC SERPL-MCNC: 59 MG/DL
HEMOGLOBIN: 14.1 G/DL (ref 13.1–17.2)
HEPATITIS C ANTIBODY: NORMAL
HIV -1/0/2 AG/AB WITH REFLEX: NON REACTIVE
HIV INTERPRETATION: NORMAL
LDL CHOLESTEROL: 54 MG/DL (ref 50–99)
LDL/HDL RATIO: 0.9
MCH RBC QN AUTO: 32 PG (ref 26–34)
MCHC RBC AUTO-ENTMCNC: 35 G/DL (ref 31–36)
MCV RBC AUTO: 92 FL (ref 80–95)
NON-HDL CHOLESTEROL: 75 MG/DL
PDW BLD-RTO: 13.3 % (ref 10–15.5)
PLATELET # BLD: 225 K/UL (ref 140–440)
PMV BLD AUTO: 11.3 FL (ref 9–13)
POTASSIUM SERPL-SCNC: 3.6 MMOL/L (ref 3.5–5.5)
RBC # BLD: 4.4 M/UL (ref 3.8–5.8)
SODIUM BLD-SCNC: 139 MMOL/L (ref 133–145)
TOTAL PROTEIN: 7.2 G/DL (ref 6.2–8.1)
TRIGL SERPL-MCNC: 103 MG/DL (ref 40–149)
TSH SERPL DL<=0.05 MIU/L-ACNC: 0.45 MCU/ML (ref 0.27–4.2)
VLDLC SERPL CALC-MCNC: 21 MG/DL (ref 8–30)
WBC # BLD: 9.4 K/UL (ref 4–11)

## 2024-09-16 ENCOUNTER — OFFICE VISIT (OUTPATIENT)
Facility: CLINIC | Age: 64
End: 2024-09-16
Payer: MEDICARE

## 2024-09-16 VITALS
BODY MASS INDEX: 27.31 KG/M2 | OXYGEN SATURATION: 97 % | WEIGHT: 174 LBS | DIASTOLIC BLOOD PRESSURE: 76 MMHG | RESPIRATION RATE: 18 BRPM | TEMPERATURE: 97.3 F | HEART RATE: 91 BPM | SYSTOLIC BLOOD PRESSURE: 124 MMHG | HEIGHT: 67 IN

## 2024-09-16 DIAGNOSIS — E11.9 TYPE 2 DIABETES MELLITUS WITHOUT COMPLICATION, WITHOUT LONG-TERM CURRENT USE OF INSULIN (HCC): Primary | ICD-10-CM

## 2024-09-16 DIAGNOSIS — Z74.1 REQUIRES ASSISTANCE WITH ACTIVITIES OF DAILY LIVING (ADL): ICD-10-CM

## 2024-09-16 DIAGNOSIS — J44.9 CHRONIC OBSTRUCTIVE PULMONARY DISEASE, UNSPECIFIED COPD TYPE (HCC): Primary | ICD-10-CM

## 2024-09-16 PROCEDURE — 3051F HG A1C>EQUAL 7.0%<8.0%: CPT | Performed by: FAMILY MEDICINE

## 2024-09-16 PROCEDURE — 3078F DIAST BP <80 MM HG: CPT | Performed by: FAMILY MEDICINE

## 2024-09-16 PROCEDURE — 3074F SYST BP LT 130 MM HG: CPT | Performed by: FAMILY MEDICINE

## 2024-09-16 PROCEDURE — 99214 OFFICE O/P EST MOD 30 MIN: CPT | Performed by: FAMILY MEDICINE

## 2024-09-16 RX ORDER — BLOOD-GLUCOSE METER
1 EACH MISCELLANEOUS DAILY
Qty: 1 KIT | Refills: 0 | Status: SHIPPED | OUTPATIENT
Start: 2024-09-16

## 2024-09-16 RX ORDER — LANCETS 30 GAUGE
1 EACH MISCELLANEOUS 2 TIMES DAILY
Qty: 100 EACH | Refills: 0 | Status: SHIPPED | OUTPATIENT
Start: 2024-09-16

## 2024-09-16 SDOH — ECONOMIC STABILITY: FOOD INSECURITY: WITHIN THE PAST 12 MONTHS, THE FOOD YOU BOUGHT JUST DIDN'T LAST AND YOU DIDN'T HAVE MONEY TO GET MORE.: NEVER TRUE

## 2024-09-16 SDOH — ECONOMIC STABILITY: INCOME INSECURITY: HOW HARD IS IT FOR YOU TO PAY FOR THE VERY BASICS LIKE FOOD, HOUSING, MEDICAL CARE, AND HEATING?: NOT HARD AT ALL

## 2024-09-16 SDOH — ECONOMIC STABILITY: FOOD INSECURITY: WITHIN THE PAST 12 MONTHS, YOU WORRIED THAT YOUR FOOD WOULD RUN OUT BEFORE YOU GOT MONEY TO BUY MORE.: NEVER TRUE

## 2024-09-16 ASSESSMENT — PATIENT HEALTH QUESTIONNAIRE - PHQ9
SUM OF ALL RESPONSES TO PHQ QUESTIONS 1-9: 0
SUM OF ALL RESPONSES TO PHQ9 QUESTIONS 1 & 2: 0
SUM OF ALL RESPONSES TO PHQ QUESTIONS 1-9: 0
1. LITTLE INTEREST OR PLEASURE IN DOING THINGS: NOT AT ALL
2. FEELING DOWN, DEPRESSED OR HOPELESS: NOT AT ALL

## 2024-10-15 ENCOUNTER — COMMUNITY OUTREACH (OUTPATIENT)
Facility: CLINIC | Age: 64
End: 2024-10-15

## 2024-10-16 ENCOUNTER — OFFICE VISIT (OUTPATIENT)
Facility: CLINIC | Age: 64
End: 2024-10-16
Payer: MEDICARE

## 2024-10-16 VITALS
HEIGHT: 67 IN | OXYGEN SATURATION: 97 % | BODY MASS INDEX: 26.21 KG/M2 | DIASTOLIC BLOOD PRESSURE: 83 MMHG | WEIGHT: 167 LBS | SYSTOLIC BLOOD PRESSURE: 122 MMHG | TEMPERATURE: 98.1 F | RESPIRATION RATE: 20 BRPM | HEART RATE: 91 BPM

## 2024-10-16 DIAGNOSIS — E11.9 TYPE 2 DIABETES MELLITUS WITHOUT COMPLICATION, WITHOUT LONG-TERM CURRENT USE OF INSULIN (HCC): ICD-10-CM

## 2024-10-16 PROCEDURE — 3074F SYST BP LT 130 MM HG: CPT | Performed by: FAMILY MEDICINE

## 2024-10-16 PROCEDURE — 3051F HG A1C>EQUAL 7.0%<8.0%: CPT | Performed by: FAMILY MEDICINE

## 2024-10-16 PROCEDURE — 99213 OFFICE O/P EST LOW 20 MIN: CPT | Performed by: FAMILY MEDICINE

## 2024-10-16 PROCEDURE — 3079F DIAST BP 80-89 MM HG: CPT | Performed by: FAMILY MEDICINE

## 2024-10-16 RX ORDER — METFORMIN HCL 500 MG
1000 TABLET, EXTENDED RELEASE 24 HR ORAL
Qty: 180 TABLET | Refills: 1 | Status: SHIPPED | OUTPATIENT
Start: 2024-10-16

## 2024-10-16 NOTE — PROGRESS NOTES
\"Have you been to the ER, urgent care clinic since your last visit?  Hospitalized since your last visit?\"    NO    “Have you seen or consulted any other health care providers outside our system since your last visit?”    NO      “Have you had a colorectal cancer screening such as a colonoscopy/FIT/Cologuard?    NO    No colonoscopy on file  No cologuard on file  No FIT/FOBT on file   No flexible sigmoidoscopy on file     “Have you had a diabetic eye exam?”    NO     No diabetic eye exam on file          
lb)   SpO2 97%   BMI 26.16 kg/m²   Physical Exam  Vitals reviewed.   Cardiovascular:      Rate and Rhythm: Normal rate and regular rhythm.   Pulmonary:      Effort: Pulmonary effort is normal.   Neurological:      Mental Status: He is alert.        Assessment/Plan:     #1.  New onset diabetes melitis  Fair control but still elevated not at goal.  Trial of metformin XR 1000 mg daily.  Follow-up in about 4 weeks bring in your list of glucose readings.  Hydrate well.  May consider adding in GLP-1 or SGLT 2 medication.      Shani Lynn MD

## 2024-10-21 ENCOUNTER — TELEPHONE (OUTPATIENT)
Facility: CLINIC | Age: 64
End: 2024-10-21

## 2024-10-21 DIAGNOSIS — E11.9 TYPE 2 DIABETES MELLITUS WITHOUT COMPLICATION, WITHOUT LONG-TERM CURRENT USE OF INSULIN (HCC): ICD-10-CM

## 2024-10-21 NOTE — TELEPHONE ENCOUNTER
[facility] pharmacy called to inform Dr Lynn the directions for the test strips need to be corrected for 6 times a day and not once a day. Please send in a new prescription for the test strips to be tested for 6 times a day to the [facility] when available

## 2024-10-24 ENCOUNTER — TELEPHONE (OUTPATIENT)
Facility: CLINIC | Age: 64
End: 2024-10-24

## 2024-10-24 DIAGNOSIS — E11.9 TYPE 2 DIABETES MELLITUS WITHOUT COMPLICATION, WITHOUT LONG-TERM CURRENT USE OF INSULIN (HCC): ICD-10-CM

## 2024-10-24 NOTE — TELEPHONE ENCOUNTER
[facility] and stated that the insurance for the pt will not cover him to take his blood sugar 6 times a day. [facility] asked if another prescription can be sent over for 3 times a day instead of 6. Please advise.

## 2024-11-08 ENCOUNTER — OFFICE VISIT (OUTPATIENT)
Age: 64
End: 2024-11-08
Payer: MEDICARE

## 2024-11-08 VITALS
BODY MASS INDEX: 24.96 KG/M2 | WEIGHT: 159 LBS | SYSTOLIC BLOOD PRESSURE: 98 MMHG | DIASTOLIC BLOOD PRESSURE: 67 MMHG | OXYGEN SATURATION: 97 % | HEIGHT: 67 IN | HEART RATE: 108 BPM

## 2024-11-08 DIAGNOSIS — I21.11 ST ELEVATION MYOCARDIAL INFARCTION INVOLVING RIGHT CORONARY ARTERY (HCC): ICD-10-CM

## 2024-11-08 DIAGNOSIS — F17.200 SMOKER: ICD-10-CM

## 2024-11-08 DIAGNOSIS — I25.10 CORONARY ARTERY DISEASE DUE TO CALCIFIED CORONARY LESION: Primary | ICD-10-CM

## 2024-11-08 DIAGNOSIS — I10 ESSENTIAL HYPERTENSION: ICD-10-CM

## 2024-11-08 DIAGNOSIS — E78.5 DYSLIPIDEMIA: ICD-10-CM

## 2024-11-08 DIAGNOSIS — I25.84 CORONARY ARTERY DISEASE DUE TO CALCIFIED CORONARY LESION: Primary | ICD-10-CM

## 2024-11-08 PROCEDURE — 99214 OFFICE O/P EST MOD 30 MIN: CPT | Performed by: NURSE PRACTITIONER

## 2024-11-08 PROCEDURE — 93000 ELECTROCARDIOGRAM COMPLETE: CPT | Performed by: NURSE PRACTITIONER

## 2024-11-08 PROCEDURE — 3074F SYST BP LT 130 MM HG: CPT | Performed by: NURSE PRACTITIONER

## 2024-11-08 PROCEDURE — 3078F DIAST BP <80 MM HG: CPT | Performed by: NURSE PRACTITIONER

## 2024-11-08 ASSESSMENT — PATIENT HEALTH QUESTIONNAIRE - PHQ9
2. FEELING DOWN, DEPRESSED OR HOPELESS: NOT AT ALL
1. LITTLE INTEREST OR PLEASURE IN DOING THINGS: NOT AT ALL
DEPRESSION UNABLE TO ASSESS: FUNCTIONAL CAPACITY MOTIVATION LIMITS ACCURACY
SUM OF ALL RESPONSES TO PHQ QUESTIONS 1-9: 0
SUM OF ALL RESPONSES TO PHQ9 QUESTIONS 1 & 2: 0
SUM OF ALL RESPONSES TO PHQ QUESTIONS 1-9: 0

## 2024-11-08 NOTE — PROGRESS NOTES
HISTORY OF PRESENT ILLNESS  Salty Rivera is a 61 y.o. male.  4/2022  Patient seen for complaint of elevated heart rate with low blood pressure.  He complains of episodes of chest pain with shortness of breath.  He reports is taking lisinopril as needed only, he is no longer taking metoprolol.  He is taking Plavix and was recently started on cholesterol medication by his primary doctor but is unsure of name.  Of note he had COVID-19 in January 2022 11/8/2024  Patient with history of CAD status post STEMI in August.  Complains of dizziness with shortness of breath.  Denies chest pain or edema has mild palpitations.  Reports is taking all medications consistently continues to smoke half pack cigarettes per day    Dizziness  The history is provided by the patient. This is a chronic problem. The problem occurs daily. The problem has been gradually improving. Associated symptoms include chest pain and shortness of breath.   Shortness of Breath  The history is provided by the patient. This is a chronic problem. The problem occurs intermittently.The problem has been gradually improving. Associated symptoms include chest pain. Pertinent negatives include no ear pain, no neck pain, no wheezing, no rash and no leg swelling. Associated medical issues do not include CAD or heart failure.       Review of Systems   Constitutional: Negative for chills and weight loss.   HENT: Negative for congestion, ear discharge, ear pain, hearing loss, nosebleeds and tinnitus.    Eyes: Negative for blurred vision.   Respiratory: Positive for shortness of breath. Negative for wheezing and stridor.    Cardiovascular: Negative for chest pain leg swelling.   Gastrointestinal: Negative for heartburn.   Musculoskeletal: Negative for myalgias and neck pain.   Skin: Negative for itching and rash.   Neurological: Positive for dizziness. Negative for tingling, tremors, focal weakness and loss of consciousness.   Psychiatric/Behavioral: Negative

## 2024-11-08 NOTE — PROGRESS NOTES
1. Have you been to the ER, urgent care clinic since your last visit?  Hospitalized since your last visit?     No    2. Have you seen or consulted any other health care providers outside of the [facility] since your last visit?  Include any pap smears or colon screening.      No

## 2025-02-05 ENCOUNTER — TELEMEDICINE (OUTPATIENT)
Facility: CLINIC | Age: 65
End: 2025-02-05

## 2025-02-05 DIAGNOSIS — R50.9 FEVER, UNSPECIFIED FEVER CAUSE: Primary | ICD-10-CM

## 2025-02-05 ASSESSMENT — ENCOUNTER SYMPTOMS: COUGH: 1

## 2025-02-05 NOTE — PROGRESS NOTES
\"Have you been to the ER, urgent care clinic since your last visit?  Hospitalized since your last visit?\"    YES - When: approximately 5 days ago.  Where and Why: [facility] for knee pain.    “Have you seen or consulted any other health care providers outside our system since your last visit?”    NO      “Have you had a colorectal cancer screening such as a colonoscopy/FIT/Cologuard?    NO    No colonoscopy on file  No cologuard on file  No FIT/FOBT on file   No flexible sigmoidoscopy on file     “Have you had a diabetic eye exam?”    NO     No diabetic eye exam on file

## 2025-02-05 NOTE — PROGRESS NOTES
2025    TELEHEALTH EVALUATION -- Audio/Visual    HPI:    Salty Rivera (:  1960) has requested an audio/video evaluation for the following concern(s):    Patient has fever 103.5 today with chills.  He son has pneumonia.  Was seen in ER for knee pain which showed mild arthritis.    Mild cough no sputum.  Mild myalgia in right leg.      Review of Systems   Constitutional:  Positive for chills and fever.   HENT:  Positive for congestion.    Respiratory:  Positive for cough.      Prior to Visit Medications    Medication Sig Taking? Authorizing Provider   blood glucose test strips (ASCENSIA AUTODISC VI;ONE TOUCH ULTRA TEST VI) strip 1 each by In Vitro route 3 times daily As needed. Yes Shani Lynn MD   metFORMIN (GLUCOPHAGE-XR) 500 MG extended release tablet Take 2 tablets by mouth daily (with breakfast) Yes Shani Lynn MD   Blood Glucose Monitoring Suppl (ONE TOUCH ULTRA 2) w/Device KIT 1 kit by Does not apply route daily Yes Shani Lynn MD   Lancets MISC 1 each by Does not apply route 2 times daily Yes Shani Lynn MD   rosuvastatin (CRESTOR) 40 MG tablet Take 1 tablet by mouth daily Yes Zen Rodriguez APRN - NP   ipratropium 0.5 mg-albuterol 2.5 mg (DUONEB) 0.5-2.5 (3) MG/3ML SOLN nebulizer solution Inhale 3 mLs into the lungs every 4 hours Yes Shani Lynn MD   fluticasone-umeclidin-vilant (TRELEGY ELLIPTA) 100-62.5-25 MCG/ACT AEPB inhaler Inhale 1 puff into the lungs daily Rinse and gargle mouth after each use Yes Eliot Rehman MD   albuterol sulfate HFA (PROVENTIL HFA) 108 (90 Base) MCG/ACT inhaler Inhale 1-2 puffs into the lungs every 4 hours as needed for Wheezing or Shortness of Breath Can substitute any formulary approved albuterol formulation (Proventil, Proair, Ventolin, etc) Yes Eliot Rehman MD   carvedilol (COREG) 6.25 MG tablet Take 1 tablet by mouth 2 times daily (with meals) Yes Provider, Historical, MD   nitroGLYCERIN (NITROSTAT) 0.4 MG SL

## 2025-03-04 ENCOUNTER — OFFICE VISIT (OUTPATIENT)
Age: 65
End: 2025-03-04
Payer: MEDICARE

## 2025-03-04 VITALS
WEIGHT: 154.6 LBS | DIASTOLIC BLOOD PRESSURE: 78 MMHG | BODY MASS INDEX: 24.27 KG/M2 | SYSTOLIC BLOOD PRESSURE: 127 MMHG | HEIGHT: 67 IN | TEMPERATURE: 96.6 F | RESPIRATION RATE: 18 BRPM | HEART RATE: 94 BPM | OXYGEN SATURATION: 95 %

## 2025-03-04 DIAGNOSIS — R06.09 DYSPNEA ON EXERTION: ICD-10-CM

## 2025-03-04 DIAGNOSIS — Z72.0 TOBACCO ABUSE: ICD-10-CM

## 2025-03-04 DIAGNOSIS — R93.89 ABNORMAL CT SCAN: ICD-10-CM

## 2025-03-04 DIAGNOSIS — J44.9 COPD, GROUP B, BY GOLD 2017 CLASSIFICATION (HCC): Primary | ICD-10-CM

## 2025-03-04 PROCEDURE — 3074F SYST BP LT 130 MM HG: CPT | Performed by: INTERNAL MEDICINE

## 2025-03-04 PROCEDURE — 3078F DIAST BP <80 MM HG: CPT | Performed by: INTERNAL MEDICINE

## 2025-03-04 PROCEDURE — 99406 BEHAV CHNG SMOKING 3-10 MIN: CPT | Performed by: INTERNAL MEDICINE

## 2025-03-04 PROCEDURE — 99214 OFFICE O/P EST MOD 30 MIN: CPT | Performed by: INTERNAL MEDICINE

## 2025-03-04 RX ORDER — ALBUTEROL SULFATE 90 UG/1
1-2 INHALANT RESPIRATORY (INHALATION) EVERY 4 HOURS PRN
Qty: 1 EACH | Refills: 5 | Status: SHIPPED | OUTPATIENT
Start: 2025-03-04

## 2025-03-04 RX ORDER — NICOTINE 21 MG/24HR
1 PATCH, TRANSDERMAL 24 HOURS TRANSDERMAL DAILY
Qty: 42 PATCH | Refills: 0 | Status: SHIPPED | OUTPATIENT
Start: 2025-03-04 | End: 2025-04-15

## 2025-03-04 RX ORDER — NICOTINE 21 MG/24HR
1 PATCH, TRANSDERMAL 24 HOURS TRANSDERMAL DAILY
Qty: 14 PATCH | Refills: 0 | Status: SHIPPED | OUTPATIENT
Start: 2025-04-15 | End: 2025-04-29

## 2025-03-04 NOTE — PATIENT INSTRUCTIONS
What is the plan?  -Continue to follow-up with Ear-Nose-Throat Physician.  -Complete CAT/CT scan of your chest and neck    -Continue Trelegy ellipta  1 puff once daily and rinse mouth thoroughly after each use  -Use Albuterol rescue inhaler and albuterol/atrovent nebulizer as needed    -Stop use of ALL tobacco products. Use nicotine patches with taper as provided    -Recommend getting Pneumonia vaccine, RSV and updated COVID-19 booster vaccination

## 2025-03-04 NOTE — PROGRESS NOTES
Salty Pierre Miguel presents today for   Chief Complaint   Patient presents with    COPD, group B, by GOLD 2017 classification    Shortness of Breath       Is someone accompanying this pt? No    Is the patient using any DME equipment during OV? No    -DME Company No    Depression Screenin/8/2024     2:16 PM   PHQ-9 Questionaire   Little interest or pleasure in doing things 0   Feeling down, depressed, or hopeless 0   PHQ-9 Total Score 0       Learning Assessment:    Failed to redirect to the Timeline version of the [employer] SmartLink.    Abuse Screening:         No data to display                Fall Risk    Failed to redirect to the Timeline version of the [employer] SmartLink.    Coordination of Care:    1. Have you been to the ER, urgent care clinic since your last visit? Hospitalized since your last visit? No    2. Have you seen or consulted any other health care providers outside of the [facility] since your last visit? Include any pap smears or colon screening. No    Medication list has been update per patient.    
Highest education level: None   Tobacco Use    Smoking status: Every Day     Current packs/day: 1.00     Average packs/day: 1 pack/day for 51.6 years (51.6 ttl pk-yrs)     Types: Cigarettes     Start date: 7/11/1973    Smokeless tobacco: Never    Tobacco comments:     Quit smoking: pack and 1/2 cigarettes a day   Vaping Use    Vaping status: Never Used   Substance and Sexual Activity    Alcohol use: Yes    Drug use: No     Social Determinants of Health     Financial Resource Strain: Low Risk  (9/16/2024)    Overall Financial Resource Strain (CARDIA)     Difficulty of Paying Living Expenses: Not hard at all   Food Insecurity: No Food Insecurity (9/16/2024)    Hunger Vital Sign     Worried About Running Out of Food in the Last Year: Never true     Ran Out of Food in the Last Year: Never true   Transportation Needs: Unknown (9/16/2024)    PRAPARE - Transportation     Lack of Transportation (Non-Medical): No   Housing Stability: Unknown (9/16/2024)    Housing Stability Vital Sign     Homeless in the Last Year: No        Family History   Problem Relation Age of Onset    No Known Problems Mother     Stroke Brother     Heart Attack Father         Allergies   Allergen Reactions    Atenolol Other (See Comments)     Pt c/o chest pain and is reluctant to take med due to being told that it could cause \"death\"    Levonorgestrel-Ethinyl Estrad Other (See Comments)     Not allergic to this medication per patient    Nalbuphine Other (See Comments)     Extremely nauseous    Seasonal Other (See Comments)    Chantix [Varenicline] Nausea And Vomiting        Current Outpatient Medications   Medication Sig Dispense Refill    albuterol sulfate HFA (PROVENTIL HFA) 108 (90 Base) MCG/ACT inhaler Inhale 1-2 puffs into the lungs every 4 hours as needed for Wheezing or Shortness of Breath Can substitute any formulary approved albuterol formulation (Proventil, Proair, Ventolin, etc) 1 each 5    nicotine (NICODERM CQ) 21 MG/24HR Place 1 patch

## 2025-03-11 ENCOUNTER — COMMUNITY OUTREACH (OUTPATIENT)
Facility: CLINIC | Age: 65
End: 2025-03-11

## 2025-07-22 RX ORDER — NITROGLYCERIN 0.4 MG/1
0.4 TABLET SUBLINGUAL EVERY 5 MIN PRN
Qty: 60 TABLET | Refills: 0 | Status: SHIPPED | OUTPATIENT
Start: 2025-07-22

## 2025-07-22 NOTE — TELEPHONE ENCOUNTER
Requested Prescriptions     Pending Prescriptions Disp Refills    nitroGLYCERIN (NITROSTAT) 0.4 MG SL tablet 60 tablet 0     Sig: Place 1 tablet under the tongue every 5 minutes as needed for Chest pain

## 2025-08-27 ENCOUNTER — OFFICE VISIT (OUTPATIENT)
Facility: CLINIC | Age: 65
End: 2025-08-27
Payer: MEDICARE

## 2025-08-27 VITALS
DIASTOLIC BLOOD PRESSURE: 80 MMHG | WEIGHT: 148 LBS | TEMPERATURE: 98.1 F | OXYGEN SATURATION: 96 % | RESPIRATION RATE: 18 BRPM | HEART RATE: 71 BPM | BODY MASS INDEX: 23.23 KG/M2 | SYSTOLIC BLOOD PRESSURE: 131 MMHG | HEIGHT: 67 IN

## 2025-08-27 DIAGNOSIS — Z79.4 TYPE 2 DIABETES MELLITUS WITH HYPERGLYCEMIA, WITH LONG-TERM CURRENT USE OF INSULIN (HCC): Primary | ICD-10-CM

## 2025-08-27 DIAGNOSIS — R41.3 MEMORY LOSS: ICD-10-CM

## 2025-08-27 DIAGNOSIS — E08.65 DIABETES MELLITUS DUE TO UNDERLYING CONDITION, CONTROLLED, WITH HYPERGLYCEMIA, WITHOUT LONG-TERM CURRENT USE OF INSULIN (HCC): ICD-10-CM

## 2025-08-27 DIAGNOSIS — Z13.1 SCREENING FOR DIABETES MELLITUS (DM): ICD-10-CM

## 2025-08-27 DIAGNOSIS — Z13.220 SCREENING, LIPID: ICD-10-CM

## 2025-08-27 DIAGNOSIS — Z12.11 SCREEN FOR COLON CANCER: ICD-10-CM

## 2025-08-27 DIAGNOSIS — E11.65 TYPE 2 DIABETES MELLITUS WITH HYPERGLYCEMIA, WITH LONG-TERM CURRENT USE OF INSULIN (HCC): Primary | ICD-10-CM

## 2025-08-27 DIAGNOSIS — R53.83 FATIGUE, UNSPECIFIED TYPE: ICD-10-CM

## 2025-08-27 DIAGNOSIS — Z13.29 THYROID DISORDER SCREENING: ICD-10-CM

## 2025-08-27 DIAGNOSIS — E83.42 HYPOMAGNESEMIA: ICD-10-CM

## 2025-08-27 LAB — HBA1C MFR BLD: 5.8 %

## 2025-08-27 PROCEDURE — G8428 CUR MEDS NOT DOCUMENT: HCPCS | Performed by: FAMILY MEDICINE

## 2025-08-27 PROCEDURE — 99214 OFFICE O/P EST MOD 30 MIN: CPT | Performed by: FAMILY MEDICINE

## 2025-08-27 PROCEDURE — 83036 HEMOGLOBIN GLYCOSYLATED A1C: CPT | Performed by: FAMILY MEDICINE

## 2025-08-27 PROCEDURE — 3046F HEMOGLOBIN A1C LEVEL >9.0%: CPT | Performed by: FAMILY MEDICINE

## 2025-08-27 PROCEDURE — 2022F DILAT RTA XM EVC RTNOPTHY: CPT | Performed by: FAMILY MEDICINE

## 2025-08-27 PROCEDURE — 3017F COLORECTAL CA SCREEN DOC REV: CPT | Performed by: FAMILY MEDICINE

## 2025-08-27 PROCEDURE — 3079F DIAST BP 80-89 MM HG: CPT | Performed by: FAMILY MEDICINE

## 2025-08-27 PROCEDURE — G8420 CALC BMI NORM PARAMETERS: HCPCS | Performed by: FAMILY MEDICINE

## 2025-08-27 PROCEDURE — 4004F PT TOBACCO SCREEN RCVD TLK: CPT | Performed by: FAMILY MEDICINE

## 2025-08-27 PROCEDURE — 3075F SYST BP GE 130 - 139MM HG: CPT | Performed by: FAMILY MEDICINE

## 2025-08-27 PROCEDURE — 3044F HG A1C LEVEL LT 7.0%: CPT | Performed by: FAMILY MEDICINE

## 2025-08-27 RX ORDER — ACYCLOVIR 400 MG/1
TABLET ORAL
Qty: 1 EACH | Refills: 11 | Status: SHIPPED | OUTPATIENT
Start: 2025-08-27

## 2025-08-27 SDOH — ECONOMIC STABILITY: FOOD INSECURITY: WITHIN THE PAST 12 MONTHS, YOU WORRIED THAT YOUR FOOD WOULD RUN OUT BEFORE YOU GOT MONEY TO BUY MORE.: NEVER TRUE

## 2025-08-27 SDOH — ECONOMIC STABILITY: FOOD INSECURITY: WITHIN THE PAST 12 MONTHS, THE FOOD YOU BOUGHT JUST DIDN'T LAST AND YOU DIDN'T HAVE MONEY TO GET MORE.: NEVER TRUE

## 2025-08-27 ASSESSMENT — PATIENT HEALTH QUESTIONNAIRE - PHQ9
3. TROUBLE FALLING OR STAYING ASLEEP: NOT AT ALL
5. POOR APPETITE OR OVEREATING: NOT AT ALL
1. LITTLE INTEREST OR PLEASURE IN DOING THINGS: NOT AT ALL
SUM OF ALL RESPONSES TO PHQ QUESTIONS 1-9: 0
8. MOVING OR SPEAKING SO SLOWLY THAT OTHER PEOPLE COULD HAVE NOTICED. OR THE OPPOSITE, BEING SO FIGETY OR RESTLESS THAT YOU HAVE BEEN MOVING AROUND A LOT MORE THAN USUAL: NOT AT ALL
10. IF YOU CHECKED OFF ANY PROBLEMS, HOW DIFFICULT HAVE THESE PROBLEMS MADE IT FOR YOU TO DO YOUR WORK, TAKE CARE OF THINGS AT HOME, OR GET ALONG WITH OTHER PEOPLE: NOT DIFFICULT AT ALL
SUM OF ALL RESPONSES TO PHQ QUESTIONS 1-9: 0
2. FEELING DOWN, DEPRESSED OR HOPELESS: NOT AT ALL
SUM OF ALL RESPONSES TO PHQ QUESTIONS 1-9: 0
9. THOUGHTS THAT YOU WOULD BE BETTER OFF DEAD, OR OF HURTING YOURSELF: NOT AT ALL
SUM OF ALL RESPONSES TO PHQ QUESTIONS 1-9: 0
6. FEELING BAD ABOUT YOURSELF - OR THAT YOU ARE A FAILURE OR HAVE LET YOURSELF OR YOUR FAMILY DOWN: NOT AT ALL
4. FEELING TIRED OR HAVING LITTLE ENERGY: NOT AT ALL
7. TROUBLE CONCENTRATING ON THINGS, SUCH AS READING THE NEWSPAPER OR WATCHING TELEVISION: NOT AT ALL

## (undated) DEVICE — PROCEDURE KIT FLUID MGMT 10 FR CUST MAINFOLD

## (undated) DEVICE — GLIDESHEATH SLENDER STAINLESS STEEL KIT: Brand: GLIDESHEATH SLENDER

## (undated) DEVICE — GUIDEWIRE VASC L260CM DIA0035IN TIP L3MM PTFE J STD TAPR FIX

## (undated) DEVICE — PRESSURE MONITORING SET: Brand: TRUWAVE

## (undated) DEVICE — DRAPE,ANGIO,BRACH,STERILE,38X44: Brand: MEDLINE

## (undated) DEVICE — RUNTHROUGH NS EXTRA FLOPPY PTCA GUIDEWIRE: Brand: RUNTHROUGH

## (undated) DEVICE — TR BAND RADIAL ARTERY COMPRESSION DEVICE: Brand: TR BAND

## (undated) DEVICE — SET FLD ADMIN 3 W STPCOCK FIX FEM L BOR 1IN

## (undated) DEVICE — COPILOT BLEEDBACK CONTROL VALVE: Brand: COPILOT

## (undated) DEVICE — CATH GUID COR EB35 6FR 100CM -- LAUNCHER

## (undated) DEVICE — RADIFOCUS OPTITORQUE ANGIOGRAPHIC CATHETER: Brand: OPTITORQUE

## (undated) DEVICE — CATHETER ANGIO 5FR L100CM GRY S STL NYL JR4 3 SEG BRAID L

## (undated) DEVICE — TREK CORONARY DILATATION CATHETER 2.50 MM X 12 MM / RAPID-EXCHANGE: Brand: TREK

## (undated) DEVICE — PACK PROCEDURE SURG VASC CATH 161 MMC LF